# Patient Record
Sex: MALE | Race: WHITE
[De-identification: names, ages, dates, MRNs, and addresses within clinical notes are randomized per-mention and may not be internally consistent; named-entity substitution may affect disease eponyms.]

---

## 2018-02-26 ENCOUNTER — HOSPITAL ENCOUNTER (INPATIENT)
Dept: HOSPITAL 100 - ED | Age: 48
LOS: 2 days | Discharge: HOME | DRG: 690 | End: 2018-02-28
Payer: SELF-PAY

## 2018-02-26 VITALS
OXYGEN SATURATION: 98 % | DIASTOLIC BLOOD PRESSURE: 108 MMHG | TEMPERATURE: 98.2 F | SYSTOLIC BLOOD PRESSURE: 167 MMHG | RESPIRATION RATE: 14 BRPM | HEART RATE: 127 BPM

## 2018-02-26 VITALS
OXYGEN SATURATION: 93 % | RESPIRATION RATE: 18 BRPM | SYSTOLIC BLOOD PRESSURE: 131 MMHG | TEMPERATURE: 97.7 F | DIASTOLIC BLOOD PRESSURE: 73 MMHG | HEART RATE: 109 BPM

## 2018-02-26 VITALS
DIASTOLIC BLOOD PRESSURE: 82 MMHG | OXYGEN SATURATION: 90 % | RESPIRATION RATE: 20 BRPM | SYSTOLIC BLOOD PRESSURE: 124 MMHG | HEART RATE: 123 BPM

## 2018-02-26 VITALS
DIASTOLIC BLOOD PRESSURE: 85 MMHG | RESPIRATION RATE: 20 BRPM | OXYGEN SATURATION: 91 % | HEART RATE: 126 BPM | SYSTOLIC BLOOD PRESSURE: 122 MMHG

## 2018-02-26 VITALS — OXYGEN SATURATION: 95 % | RESPIRATION RATE: 21 BRPM | HEART RATE: 126 BPM

## 2018-02-26 VITALS
SYSTOLIC BLOOD PRESSURE: 142 MMHG | OXYGEN SATURATION: 97 % | RESPIRATION RATE: 16 BRPM | DIASTOLIC BLOOD PRESSURE: 102 MMHG | HEART RATE: 122 BPM

## 2018-02-26 VITALS — RESPIRATION RATE: 16 BRPM | OXYGEN SATURATION: 93 % | HEART RATE: 117 BPM

## 2018-02-26 VITALS
SYSTOLIC BLOOD PRESSURE: 121 MMHG | HEART RATE: 103 BPM | OXYGEN SATURATION: 98 % | DIASTOLIC BLOOD PRESSURE: 84 MMHG | TEMPERATURE: 97.16 F | RESPIRATION RATE: 20 BRPM

## 2018-02-26 VITALS — RESPIRATION RATE: 18 BRPM | OXYGEN SATURATION: 98 %

## 2018-02-26 VITALS — OXYGEN SATURATION: 95 %

## 2018-02-26 VITALS — BODY MASS INDEX: 53.8 KG/M2

## 2018-02-26 DIAGNOSIS — N30.01: Primary | ICD-10-CM

## 2018-02-26 DIAGNOSIS — E66.2: ICD-10-CM

## 2018-02-26 DIAGNOSIS — Z86.718: ICD-10-CM

## 2018-02-26 DIAGNOSIS — F17.201: ICD-10-CM

## 2018-02-26 DIAGNOSIS — I10: ICD-10-CM

## 2018-02-26 DIAGNOSIS — Z79.01: ICD-10-CM

## 2018-02-26 DIAGNOSIS — E11.65: ICD-10-CM

## 2018-02-26 DIAGNOSIS — Z79.4: ICD-10-CM

## 2018-02-26 DIAGNOSIS — I27.20: ICD-10-CM

## 2018-02-26 DIAGNOSIS — R09.02: ICD-10-CM

## 2018-02-26 LAB
ALANINE AMINOTRANSFER ALT/SGPT: 74 U/L (ref 16–61)
ALBUMIN SERPL-MCNC: 3 G/DL (ref 3.2–5)
ALKALINE PHOSPHATASE: 93 U/L (ref 45–117)
ANION GAP: 10 (ref 5–15)
APTT PPP: 34.6 SECONDS (ref 24.1–36.2)
AST(SGOT): 105 U/L (ref 15–37)
BILIRUB DIRECT SERPL-MCNC: 0.15 MG/DL (ref 0–0.3)
BNP,B-TYPE NATRIURETIC PEPTIDE: 8.1 PG/ML (ref 0–100)
BUN SERPL-MCNC: 12 MG/DL (ref 7–18)
BUN/CREAT RATIO: 11.2 RATIO (ref 10–20)
CALCIUM SERPL-MCNC: 8.4 MG/DL (ref 8.5–10.1)
CARBON DIOXIDE: 23 MMOL/L (ref 21–32)
CHLORIDE: 104 MMOL/L (ref 98–107)
DEPRECATED RDW RBC: 45.8 FL (ref 35.1–43.9)
DIFFERENTIAL INDICATED: (no result)
ERYTHROCYTE [DISTWIDTH] IN BLOOD: 13.4 % (ref 11.6–14.6)
EST GLOM FILT RATE - AFR AMER: 95 ML/MIN (ref 60–?)
ESTIMATED CREATININE CLEARANCE: 90.9 ML/MIN
GLOBULIN: 5.1 G/DL (ref 2.2–4.2)
GLUCOSE: 220 MG/DL (ref 74–106)
HCT VFR BLD AUTO: 44.2 % (ref 40–54)
HEMOGLOBIN: 15.1 G/DL (ref 13–16.5)
HGB BLD-MCNC: 15.1 G/DL (ref 13–16.5)
IMMATURE GRANULOCYTES COUNT: 0.03 X10^3/UL (ref 0–0)
LEUKOCYTE ESTERASE UR QL STRIP: 500 /UL
MCV RBC: 93.4 FL (ref 80–94)
MEAN CORP HGB CONC: 34.2 G/GL (ref 32–36)
MEAN PLATELET VOL.: 9.4 FL (ref 6.2–12)
MUCOUS THREADS URNS QL MICRO: (no result) /HPF
PLATELET # BLD: 292 K/MM3 (ref 150–450)
PLATELET COUNT: 292 K/MM3 (ref 150–450)
POSITIVE COUNT: NO
POSITIVE DIFFERENTIAL: NO
POSITIVE MORPHOLOGY: NO
POTASSIUM: 3.5 MMOL/L (ref 3.5–5.1)
PROT UR QL STRIP.AUTO: 100 MG/DL
PROTHROMBIN TIME (PROTIME)PT.: 16.6 SECONDS (ref 11.7–14.9)
RBC # BLD AUTO: 4.73 M/MM3 (ref 4.6–6.2)
RBC DISTRIBUTION WIDTH CV: 13.4 % (ref 11.6–14.6)
RBC DISTRIBUTION WIDTH SD: 45.8 FL (ref 35.1–43.9)
RBC UR QL: (no result) /HPF (ref 0–5)
RBC UR QL: 250 /UL
SP GR UR: 1.01 (ref 1–1.03)
SQUAMOUS URNS QL MICRO: (no result) /HPF (ref 0–5)
URINE PRESERVATIVE: (no result)
WBC # BLD AUTO: 10 K/MM3 (ref 4.4–11)
WHITE BLOOD COUNT: 10 K/MM3 (ref 4.4–11)

## 2018-02-26 PROCEDURE — 87205 SMEAR GRAM STAIN: CPT

## 2018-02-26 PROCEDURE — 80076 HEPATIC FUNCTION PANEL: CPT

## 2018-02-26 PROCEDURE — 82947 ASSAY GLUCOSE BLOOD QUANT: CPT

## 2018-02-26 PROCEDURE — G0463 HOSPITAL OUTPT CLINIC VISIT: HCPCS

## 2018-02-26 PROCEDURE — 83036 HEMOGLOBIN GLYCOSYLATED A1C: CPT

## 2018-02-26 PROCEDURE — 36415 COLL VENOUS BLD VENIPUNCTURE: CPT

## 2018-02-26 PROCEDURE — 82962 GLUCOSE BLOOD TEST: CPT

## 2018-02-26 PROCEDURE — 94668 MNPJ CHEST WALL SBSQ: CPT

## 2018-02-26 PROCEDURE — 71046 X-RAY EXAM CHEST 2 VIEWS: CPT

## 2018-02-26 PROCEDURE — 87070 CULTURE OTHR SPECIMN AEROBIC: CPT

## 2018-02-26 PROCEDURE — 87086 URINE CULTURE/COLONY COUNT: CPT

## 2018-02-26 PROCEDURE — 85610 PROTHROMBIN TIME: CPT

## 2018-02-26 PROCEDURE — 85730 THROMBOPLASTIN TIME PARTIAL: CPT

## 2018-02-26 PROCEDURE — 81001 URINALYSIS AUTO W/SCOPE: CPT

## 2018-02-26 PROCEDURE — 99251: CPT

## 2018-02-26 PROCEDURE — 83880 ASSAY OF NATRIURETIC PEPTIDE: CPT

## 2018-02-26 PROCEDURE — 93005 ELECTROCARDIOGRAM TRACING: CPT

## 2018-02-26 PROCEDURE — 80048 BASIC METABOLIC PNL TOTAL CA: CPT

## 2018-02-26 PROCEDURE — 84484 ASSAY OF TROPONIN QUANT: CPT

## 2018-02-26 PROCEDURE — A4216 STERILE WATER/SALINE, 10 ML: HCPCS

## 2018-02-26 PROCEDURE — 87077 CULTURE AEROBIC IDENTIFY: CPT

## 2018-02-26 PROCEDURE — 94640 AIRWAY INHALATION TREATMENT: CPT

## 2018-02-26 PROCEDURE — 87804 INFLUENZA ASSAY W/OPTIC: CPT

## 2018-02-26 PROCEDURE — 99285 EMERGENCY DEPT VISIT HI MDM: CPT

## 2018-02-26 PROCEDURE — 94667 MNPJ CHEST WALL 1ST: CPT

## 2018-02-26 PROCEDURE — 99406 BEHAV CHNG SMOKING 3-10 MIN: CPT

## 2018-02-26 PROCEDURE — 85025 COMPLETE CBC W/AUTO DIFF WBC: CPT

## 2018-02-26 PROCEDURE — 74176 CT ABD & PELVIS W/O CONTRAST: CPT

## 2018-02-26 RX ADMIN — ALBUTEROL SULFATE 2.5 MG: 2.5 SOLUTION RESPIRATORY (INHALATION) at 14:11

## 2018-02-26 RX ADMIN — SODIUM CHLORIDE 75 ML: 9 INJECTION, SOLUTION INTRAVENOUS at 18:13

## 2018-02-27 VITALS
TEMPERATURE: 97.7 F | RESPIRATION RATE: 18 BRPM | DIASTOLIC BLOOD PRESSURE: 76 MMHG | SYSTOLIC BLOOD PRESSURE: 134 MMHG | OXYGEN SATURATION: 95 % | HEART RATE: 77 BPM

## 2018-02-27 VITALS — HEART RATE: 85 BPM | RESPIRATION RATE: 18 BRPM

## 2018-02-27 VITALS
SYSTOLIC BLOOD PRESSURE: 143 MMHG | RESPIRATION RATE: 18 BRPM | DIASTOLIC BLOOD PRESSURE: 80 MMHG | OXYGEN SATURATION: 96 % | TEMPERATURE: 97.52 F | HEART RATE: 112 BPM

## 2018-02-27 VITALS — OXYGEN SATURATION: 91 % | HEART RATE: 114 BPM | RESPIRATION RATE: 18 BRPM

## 2018-02-27 VITALS
RESPIRATION RATE: 20 BRPM | DIASTOLIC BLOOD PRESSURE: 66 MMHG | OXYGEN SATURATION: 92 % | SYSTOLIC BLOOD PRESSURE: 129 MMHG | HEART RATE: 94 BPM | TEMPERATURE: 97.52 F

## 2018-02-27 VITALS — OXYGEN SATURATION: 95 % | RESPIRATION RATE: 12 BRPM

## 2018-02-27 VITALS
TEMPERATURE: 97.4 F | SYSTOLIC BLOOD PRESSURE: 147 MMHG | RESPIRATION RATE: 18 BRPM | DIASTOLIC BLOOD PRESSURE: 82 MMHG | HEART RATE: 80 BPM | OXYGEN SATURATION: 97 %

## 2018-02-27 VITALS — HEART RATE: 110 BPM | RESPIRATION RATE: 18 BRPM

## 2018-02-27 VITALS — RESPIRATION RATE: 20 BRPM | HEART RATE: 118 BPM

## 2018-02-27 VITALS — RESPIRATION RATE: 12 BRPM

## 2018-02-27 LAB
ANION GAP: 10 (ref 5–15)
BUN SERPL-MCNC: 14 MG/DL (ref 7–18)
BUN/CREAT RATIO: 12.7 RATIO (ref 10–20)
CALCIUM SERPL-MCNC: 8.6 MG/DL (ref 8.5–10.1)
CARBON DIOXIDE: 21 MMOL/L (ref 21–32)
CHLORIDE: 101 MMOL/L (ref 98–107)
DEPRECATED RDW RBC: 46.6 FL (ref 35.1–43.9)
DIFFERENTIAL INDICATED: (no result)
ERYTHROCYTE [DISTWIDTH] IN BLOOD: 13.5 % (ref 11.6–14.6)
EST GLOM FILT RATE - AFR AMER: 92 ML/MIN (ref 60–?)
ESTIMATED CREATININE CLEARANCE: 88.42 ML/MIN
GLUCOSE: 381 MG/DL (ref 74–106)
GLUCOSE: 494 MG/DL (ref 74–106)
HCT VFR BLD AUTO: 44.3 % (ref 40–54)
HEMOGLOBIN: 14.8 G/DL (ref 13–16.5)
HGB BLD-MCNC: 14.8 G/DL (ref 13–16.5)
IMMATURE GRANULOCYTES COUNT: 0.03 X10^3/UL (ref 0–0)
MCV RBC: 95.1 FL (ref 80–94)
MEAN CORP HGB CONC: 33.4 G/GL (ref 32–36)
MEAN PLATELET VOL.: 9.9 FL (ref 6.2–12)
PLATELET # BLD: 253 K/MM3 (ref 150–450)
PLATELET COUNT: 253 K/MM3 (ref 150–450)
POSITIVE COUNT: NO
POSITIVE DIFFERENTIAL: NO
POSITIVE MORPHOLOGY: NO
POTASSIUM: 4.1 MMOL/L (ref 3.5–5.1)
RBC # BLD AUTO: 4.66 M/MM3 (ref 4.6–6.2)
RBC DISTRIBUTION WIDTH CV: 13.5 % (ref 11.6–14.6)
RBC DISTRIBUTION WIDTH SD: 46.6 FL (ref 35.1–43.9)
WBC # BLD AUTO: 11.8 K/MM3 (ref 4.4–11)
WHITE BLOOD COUNT: 11.8 K/MM3 (ref 4.4–11)

## 2018-02-27 RX ADMIN — SODIUM CHLORIDE 75 ML: 9 INJECTION, SOLUTION INTRAVENOUS at 07:59

## 2018-02-27 RX ADMIN — HYDROCHLOROTHIAZIDE 12.5 MG: 12.5 CAPSULE ORAL at 08:00

## 2018-02-28 VITALS
RESPIRATION RATE: 18 BRPM | HEART RATE: 76 BPM | TEMPERATURE: 97.5 F | DIASTOLIC BLOOD PRESSURE: 81 MMHG | OXYGEN SATURATION: 95 % | SYSTOLIC BLOOD PRESSURE: 123 MMHG

## 2018-02-28 VITALS — RESPIRATION RATE: 20 BRPM | HEART RATE: 96 BPM

## 2018-02-28 VITALS — OXYGEN SATURATION: 94 % | HEART RATE: 64 BPM | RESPIRATION RATE: 18 BRPM

## 2018-02-28 VITALS
DIASTOLIC BLOOD PRESSURE: 82 MMHG | OXYGEN SATURATION: 93 % | SYSTOLIC BLOOD PRESSURE: 138 MMHG | HEART RATE: 94 BPM | RESPIRATION RATE: 16 BRPM | TEMPERATURE: 98.06 F

## 2018-02-28 VITALS
TEMPERATURE: 98.3 F | SYSTOLIC BLOOD PRESSURE: 133 MMHG | HEART RATE: 82 BPM | RESPIRATION RATE: 16 BRPM | OXYGEN SATURATION: 98 % | DIASTOLIC BLOOD PRESSURE: 85 MMHG

## 2018-02-28 VITALS — RESPIRATION RATE: 18 BRPM | HEART RATE: 90 BPM

## 2018-02-28 VITALS — RESPIRATION RATE: 16 BRPM

## 2018-02-28 LAB
ANION GAP: 9 (ref 5–15)
BUN SERPL-MCNC: 16 MG/DL (ref 7–18)
BUN/CREAT RATIO: 12.8 RATIO (ref 10–20)
CALCIUM SERPL-MCNC: 8.6 MG/DL (ref 8.5–10.1)
CARBON DIOXIDE: 25 MMOL/L (ref 21–32)
CHLORIDE: 102 MMOL/L (ref 98–107)
EST GLOM FILT RATE - AFR AMER: 79 ML/MIN (ref 60–?)
ESTIMATED CREATININE CLEARANCE: 77.81 ML/MIN
GLUCOSE: 239 MG/DL (ref 74–106)
POTASSIUM: 3.8 MMOL/L (ref 3.5–5.1)

## 2018-02-28 RX ADMIN — SODIUM CHLORIDE, PRESERVATIVE FREE 0 ML: 5 INJECTION INTRAVENOUS at 09:14

## 2018-02-28 RX ADMIN — HYDROCHLOROTHIAZIDE 12.5 MG: 12.5 CAPSULE ORAL at 11:31

## 2018-03-14 ENCOUNTER — HOSPITAL ENCOUNTER (INPATIENT)
Dept: HOSPITAL 100 - ED | Age: 48
LOS: 6 days | Discharge: NURSING, REHAB OR LONG-TERM CARE | DRG: 235 | End: 2018-03-20
Payer: MEDICAID

## 2018-03-14 VITALS — OXYGEN SATURATION: 97 % | HEART RATE: 71 BPM | RESPIRATION RATE: 22 BRPM

## 2018-03-14 VITALS
DIASTOLIC BLOOD PRESSURE: 63 MMHG | RESPIRATION RATE: 20 BRPM | TEMPERATURE: 98.7 F | OXYGEN SATURATION: 96 % | SYSTOLIC BLOOD PRESSURE: 123 MMHG | HEART RATE: 88 BPM

## 2018-03-14 VITALS
SYSTOLIC BLOOD PRESSURE: 138 MMHG | TEMPERATURE: 97.88 F | DIASTOLIC BLOOD PRESSURE: 67 MMHG | OXYGEN SATURATION: 96 % | RESPIRATION RATE: 18 BRPM | HEART RATE: 85 BPM

## 2018-03-14 VITALS
SYSTOLIC BLOOD PRESSURE: 145 MMHG | DIASTOLIC BLOOD PRESSURE: 71 MMHG | RESPIRATION RATE: 18 BRPM | OXYGEN SATURATION: 100 %

## 2018-03-14 VITALS
HEART RATE: 82 BPM | RESPIRATION RATE: 17 BRPM | SYSTOLIC BLOOD PRESSURE: 145 MMHG | DIASTOLIC BLOOD PRESSURE: 71 MMHG | OXYGEN SATURATION: 94 %

## 2018-03-14 VITALS — SYSTOLIC BLOOD PRESSURE: 136 MMHG | OXYGEN SATURATION: 94 % | DIASTOLIC BLOOD PRESSURE: 76 MMHG

## 2018-03-14 VITALS — SYSTOLIC BLOOD PRESSURE: 132 MMHG | DIASTOLIC BLOOD PRESSURE: 82 MMHG

## 2018-03-14 VITALS — BODY MASS INDEX: 56.1 KG/M2 | BODY MASS INDEX: 58.3 KG/M2

## 2018-03-14 VITALS — OXYGEN SATURATION: 97 %

## 2018-03-14 DIAGNOSIS — R31.9: ICD-10-CM

## 2018-03-14 DIAGNOSIS — Z79.899: ICD-10-CM

## 2018-03-14 DIAGNOSIS — S72.434A: Primary | ICD-10-CM

## 2018-03-14 DIAGNOSIS — G47.33: ICD-10-CM
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
## 2018-03-14 DIAGNOSIS — F17.201: ICD-10-CM

## 2018-03-14 DIAGNOSIS — Z79.4: ICD-10-CM

## 2018-03-14 DIAGNOSIS — I27.20: ICD-10-CM

## 2018-03-14 DIAGNOSIS — E66.01: ICD-10-CM

## 2018-03-14 DIAGNOSIS — K21.9: ICD-10-CM

## 2018-03-14 DIAGNOSIS — I10: ICD-10-CM
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
## 2018-03-14 DIAGNOSIS — E11.9: ICD-10-CM

## 2018-03-14 DIAGNOSIS — Z79.02: ICD-10-CM

## 2018-03-14 DIAGNOSIS — I07.1: ICD-10-CM

## 2018-03-14 DIAGNOSIS — Z86.711: ICD-10-CM

## 2018-03-14 DIAGNOSIS — J98.4: ICD-10-CM

## 2018-03-14 LAB
ANION GAP: 10 (ref 5–15)
APTT PPP: 35.8 SECONDS (ref 24.1–36.2)
BUN SERPL-MCNC: 14 MG/DL (ref 7–18)
BUN/CREAT RATIO: 13.3 RATIO (ref 10–20)
CALCIUM SERPL-MCNC: 8.6 MG/DL (ref 8.5–10.1)
CARBON DIOXIDE: 22 MMOL/L (ref 21–32)
CHLORIDE: 107 MMOL/L (ref 98–107)
DEPRECATED RDW RBC: 47.4 FL (ref 35.1–43.9)
DIFFERENTIAL INDICATED: (no result)
ERYTHROCYTE [DISTWIDTH] IN BLOOD: 13.4 % (ref 11.6–14.6)
EST GLOM FILT RATE - AFR AMER: 97 ML/MIN (ref 60–?)
ESTIMATED CREATININE CLEARANCE: 92.63 ML/MIN
GLUCOSE: 93 MG/DL (ref 74–106)
HCT VFR BLD AUTO: 42.9 % (ref 40–54)
HEMOGLOBIN: 14.2 G/DL (ref 13–16.5)
HGB BLD-MCNC: 14.2 G/DL (ref 13–16.5)
IMMATURE GRANULOCYTES COUNT: 0.03 X10^3/UL (ref 0–0)
MCV RBC: 96.6 FL (ref 80–94)
MEAN CORP HGB CONC: 33.1 G/GL (ref 32–36)
MEAN PLATELET VOL.: 9.6 FL (ref 6.2–12)
PLATELET # BLD: 248 K/MM3 (ref 150–450)
PLATELET COUNT: 248 K/MM3 (ref 150–450)
POSITIVE COUNT: NO
POSITIVE DIFFERENTIAL: NO
POSITIVE MORPHOLOGY: NO
POTASSIUM: 4.3 MMOL/L (ref 3.5–5.1)
PROTHROMBIN TIME (PROTIME)PT.: 19.2 SECONDS (ref 11.7–14.9)
RBC # BLD AUTO: 4.44 M/MM3 (ref 4.6–6.2)
RBC DISTRIBUTION WIDTH CV: 13.4 % (ref 11.6–14.6)
RBC DISTRIBUTION WIDTH SD: 47.4 FL (ref 35.1–43.9)
WBC # BLD AUTO: 8.9 K/MM3 (ref 4.4–11)
WHITE BLOOD COUNT: 8.9 K/MM3 (ref 4.4–11)

## 2018-03-14 PROCEDURE — 97530 THERAPEUTIC ACTIVITIES: CPT

## 2018-03-14 PROCEDURE — 80048 BASIC METABOLIC PNL TOTAL CA: CPT

## 2018-03-14 PROCEDURE — 72170 X-RAY EXAM OF PELVIS: CPT

## 2018-03-14 PROCEDURE — 85730 THROMBOPLASTIN TIME PARTIAL: CPT

## 2018-03-14 PROCEDURE — 93005 ELECTROCARDIOGRAM TRACING: CPT

## 2018-03-14 PROCEDURE — 82962 GLUCOSE BLOOD TEST: CPT

## 2018-03-14 PROCEDURE — 81001 URINALYSIS AUTO W/SCOPE: CPT

## 2018-03-14 PROCEDURE — 36415 COLL VENOUS BLD VENIPUNCTURE: CPT

## 2018-03-14 PROCEDURE — 73552 X-RAY EXAM OF FEMUR 2/>: CPT

## 2018-03-14 PROCEDURE — 71045 X-RAY EXAM CHEST 1 VIEW: CPT

## 2018-03-14 PROCEDURE — 73700 CT LOWER EXTREMITY W/O DYE: CPT

## 2018-03-14 PROCEDURE — 73560 X-RAY EXAM OF KNEE 1 OR 2: CPT

## 2018-03-14 PROCEDURE — 85610 PROTHROMBIN TIME: CPT

## 2018-03-14 PROCEDURE — A4216 STERILE WATER/SALINE, 10 ML: HCPCS

## 2018-03-14 PROCEDURE — 94002 VENT MGMT INPAT INIT DAY: CPT

## 2018-03-14 PROCEDURE — 99284 EMERGENCY DEPT VISIT MOD MDM: CPT

## 2018-03-14 PROCEDURE — 97162 PT EVAL MOD COMPLEX 30 MIN: CPT

## 2018-03-14 PROCEDURE — 80076 HEPATIC FUNCTION PANEL: CPT

## 2018-03-14 PROCEDURE — 97165 OT EVAL LOW COMPLEX 30 MIN: CPT

## 2018-03-14 PROCEDURE — 93971 EXTREMITY STUDY: CPT

## 2018-03-14 PROCEDURE — 73590 X-RAY EXAM OF LOWER LEG: CPT

## 2018-03-14 PROCEDURE — 83036 HEMOGLOBIN GLYCOSYLATED A1C: CPT

## 2018-03-14 PROCEDURE — 97110 THERAPEUTIC EXERCISES: CPT

## 2018-03-14 PROCEDURE — 85025 COMPLETE CBC W/AUTO DIFF WBC: CPT

## 2018-03-14 RX ADMIN — SODIUM CHLORIDE, PRESERVATIVE FREE 0 ML: 5 INJECTION INTRAVENOUS at 23:01

## 2018-03-15 VITALS
RESPIRATION RATE: 18 BRPM | SYSTOLIC BLOOD PRESSURE: 104 MMHG | TEMPERATURE: 98.24 F | DIASTOLIC BLOOD PRESSURE: 58 MMHG | HEART RATE: 66 BPM | OXYGEN SATURATION: 95 %

## 2018-03-15 VITALS
SYSTOLIC BLOOD PRESSURE: 91 MMHG | RESPIRATION RATE: 20 BRPM | OXYGEN SATURATION: 92 % | HEART RATE: 74 BPM | DIASTOLIC BLOOD PRESSURE: 56 MMHG | TEMPERATURE: 98 F

## 2018-03-15 VITALS
SYSTOLIC BLOOD PRESSURE: 106 MMHG | TEMPERATURE: 98.7 F | HEART RATE: 73 BPM | DIASTOLIC BLOOD PRESSURE: 58 MMHG | RESPIRATION RATE: 20 BRPM | OXYGEN SATURATION: 94 %

## 2018-03-15 VITALS
HEART RATE: 87 BPM | DIASTOLIC BLOOD PRESSURE: 69 MMHG | OXYGEN SATURATION: 96 % | SYSTOLIC BLOOD PRESSURE: 115 MMHG | TEMPERATURE: 99.32 F | RESPIRATION RATE: 18 BRPM

## 2018-03-15 VITALS — OXYGEN SATURATION: 95 %

## 2018-03-15 VITALS — HEART RATE: 73 BPM

## 2018-03-15 LAB
ALANINE AMINOTRANSFER ALT/SGPT: 72 U/L (ref 16–61)
ALBUMIN SERPL-MCNC: 3.2 G/DL (ref 3.2–5)
ALKALINE PHOSPHATASE: 68 U/L (ref 45–117)
APTT PPP: 35.2 SECONDS (ref 24.1–36.2)
APTT PPP: 36.5 SECONDS (ref 24.1–36.2)
AST(SGOT): 83 U/L (ref 15–37)
BILIRUB DIRECT SERPL-MCNC: 0.14 MG/DL (ref 0–0.3)
DEPRECATED RDW RBC: 46.6 FL (ref 35.1–43.9)
DIFFERENTIAL INDICATED: (no result)
ERYTHROCYTE [DISTWIDTH] IN BLOOD: 13.6 % (ref 11.6–14.6)
GLOBULIN: 4.2 G/DL (ref 2.2–4.2)
HCT VFR BLD AUTO: 43.6 % (ref 40–54)
HEMOGLOBIN: 14.4 G/DL (ref 13–16.5)
HGB BLD-MCNC: 14.4 G/DL (ref 13–16.5)
IMMATURE GRANULOCYTES COUNT: 0.03 X10^3/UL (ref 0–0)
MCV RBC: 96 FL (ref 80–94)
MEAN CORP HGB CONC: 33 G/GL (ref 32–36)
MEAN PLATELET VOL.: 10.4 FL (ref 6.2–12)
PLATELET # BLD: 304 K/MM3 (ref 150–450)
PLATELET COUNT: 304 K/MM3 (ref 150–450)
POSITIVE COUNT: NO
POSITIVE DIFFERENTIAL: NO
POSITIVE MORPHOLOGY: NO
PROTHROMBIN TIME (PROTIME)PT.: 15.6 SECONDS (ref 11.7–14.9)
RBC # BLD AUTO: 4.54 M/MM3 (ref 4.6–6.2)
RBC DISTRIBUTION WIDTH CV: 13.6 % (ref 11.6–14.6)
RBC DISTRIBUTION WIDTH SD: 46.6 FL (ref 35.1–43.9)
WBC # BLD AUTO: 9.9 K/MM3 (ref 4.4–11)
WHITE BLOOD COUNT: 9.9 K/MM3 (ref 4.4–11)

## 2018-03-15 RX ADMIN — HEPARIN SODIUM 22 UNITS: 10000 INJECTION, SOLUTION INTRAVENOUS at 08:41

## 2018-03-15 RX ADMIN — SODIUM CHLORIDE, PRESERVATIVE FREE 0 ML: 5 INJECTION INTRAVENOUS at 10:52

## 2018-03-15 RX ADMIN — SODIUM CHLORIDE, PRESERVATIVE FREE 0 ML: 5 INJECTION INTRAVENOUS at 19:17

## 2018-03-15 RX ADMIN — SODIUM CHLORIDE, PRESERVATIVE FREE 0 ML: 5 INJECTION INTRAVENOUS at 23:46

## 2018-03-15 RX ADMIN — SODIUM CHLORIDE, PRESERVATIVE FREE 0 ML: 5 INJECTION INTRAVENOUS at 12:04

## 2018-03-15 RX ADMIN — SODIUM CHLORIDE, PRESERVATIVE FREE 0 ML: 5 INJECTION INTRAVENOUS at 03:40

## 2018-03-15 RX ADMIN — SODIUM CHLORIDE, PRESERVATIVE FREE 0 ML: 5 INJECTION INTRAVENOUS at 15:31

## 2018-03-15 RX ADMIN — SODIUM CHLORIDE, PRESERVATIVE FREE 0 ML: 5 INJECTION INTRAVENOUS at 07:16

## 2018-03-16 VITALS
RESPIRATION RATE: 18 BRPM | HEART RATE: 97 BPM | SYSTOLIC BLOOD PRESSURE: 107 MMHG | TEMPERATURE: 97.34 F | DIASTOLIC BLOOD PRESSURE: 66 MMHG | OXYGEN SATURATION: 96 %

## 2018-03-16 VITALS
SYSTOLIC BLOOD PRESSURE: 119 MMHG | RESPIRATION RATE: 18 BRPM | OXYGEN SATURATION: 98 % | HEART RATE: 89 BPM | DIASTOLIC BLOOD PRESSURE: 69 MMHG | TEMPERATURE: 99.3 F

## 2018-03-16 VITALS — SYSTOLIC BLOOD PRESSURE: 116 MMHG | DIASTOLIC BLOOD PRESSURE: 70 MMHG | HEART RATE: 85 BPM

## 2018-03-16 VITALS
RESPIRATION RATE: 20 BRPM | DIASTOLIC BLOOD PRESSURE: 60 MMHG | OXYGEN SATURATION: 93 % | TEMPERATURE: 98.78 F | SYSTOLIC BLOOD PRESSURE: 111 MMHG | HEART RATE: 90 BPM

## 2018-03-16 VITALS
OXYGEN SATURATION: 94 % | SYSTOLIC BLOOD PRESSURE: 113 MMHG | RESPIRATION RATE: 20 BRPM | TEMPERATURE: 98.24 F | HEART RATE: 84 BPM | DIASTOLIC BLOOD PRESSURE: 72 MMHG

## 2018-03-16 VITALS — HEART RATE: 90 BPM

## 2018-03-16 LAB
ANION GAP: 7 (ref 5–15)
BUN SERPL-MCNC: 14 MG/DL (ref 7–18)
BUN/CREAT RATIO: 13 RATIO (ref 10–20)
CALCIUM SERPL-MCNC: 8.3 MG/DL (ref 8.5–10.1)
CARBON DIOXIDE: 27 MMOL/L (ref 21–32)
CHLORIDE: 102 MMOL/L (ref 98–107)
DEPRECATED RDW RBC: 46.2 FL (ref 35.1–43.9)
DIFFERENTIAL INDICATED: (no result)
ERYTHROCYTE [DISTWIDTH] IN BLOOD: 13.4 % (ref 11.6–14.6)
EST GLOM FILT RATE - AFR AMER: 94 ML/MIN (ref 60–?)
ESTIMATED CREATININE CLEARANCE: 90.06 ML/MIN
GLUCOSE: 95 MG/DL (ref 74–106)
HCT VFR BLD AUTO: 41.2 % (ref 40–54)
HEMOGLOBIN: 13.8 G/DL (ref 13–16.5)
HGB BLD-MCNC: 13.8 G/DL (ref 13–16.5)
IMMATURE GRANULOCYTES COUNT: 0.02 X10^3/UL (ref 0–0)
LEUKOCYTE ESTERASE UR QL STRIP: 25 /UL
MCV RBC: 96.3 FL (ref 80–94)
MEAN CORP HGB CONC: 33.5 G/GL (ref 32–36)
MEAN PLATELET VOL.: 10.2 FL (ref 6.2–12)
MUCOUS THREADS URNS QL MICRO: (no result) /HPF
PLATELET # BLD: 246 K/MM3 (ref 150–450)
PLATELET COUNT: 246 K/MM3 (ref 150–450)
POSITIVE COUNT: NO
POSITIVE DIFFERENTIAL: NO
POSITIVE MORPHOLOGY: NO
POTASSIUM: 3.7 MMOL/L (ref 3.5–5.1)
RBC # BLD AUTO: 4.28 M/MM3 (ref 4.6–6.2)
RBC DISTRIBUTION WIDTH CV: 13.4 % (ref 11.6–14.6)
RBC DISTRIBUTION WIDTH SD: 46.2 FL (ref 35.1–43.9)
RBC UR QL: (no result) /HPF (ref 0–5)
RBC UR QL: 250 /UL
SP GR UR: 1.01 (ref 1–1.03)
SQUAMOUS URNS QL MICRO: (no result) /HPF (ref 0–5)
URINE PRESERVATIVE: (no result)
WBC # BLD AUTO: 8.4 K/MM3 (ref 4.4–11)
WHITE BLOOD COUNT: 8.4 K/MM3 (ref 4.4–11)

## 2018-03-16 RX ADMIN — SODIUM CHLORIDE, PRESERVATIVE FREE 0 ML: 5 INJECTION INTRAVENOUS at 13:07

## 2018-03-16 RX ADMIN — SODIUM CHLORIDE, PRESERVATIVE FREE 0 ML: 5 INJECTION INTRAVENOUS at 08:19

## 2018-03-16 RX ADMIN — SODIUM CHLORIDE, PRESERVATIVE FREE 0 ML: 5 INJECTION INTRAVENOUS at 08:06

## 2018-03-17 VITALS
TEMPERATURE: 96.5 F | RESPIRATION RATE: 18 BRPM | DIASTOLIC BLOOD PRESSURE: 65 MMHG | SYSTOLIC BLOOD PRESSURE: 112 MMHG | OXYGEN SATURATION: 94 % | HEART RATE: 75 BPM

## 2018-03-17 VITALS
TEMPERATURE: 98.5 F | HEART RATE: 91 BPM | SYSTOLIC BLOOD PRESSURE: 146 MMHG | RESPIRATION RATE: 20 BRPM | OXYGEN SATURATION: 97 % | DIASTOLIC BLOOD PRESSURE: 70 MMHG

## 2018-03-17 VITALS
DIASTOLIC BLOOD PRESSURE: 59 MMHG | RESPIRATION RATE: 16 BRPM | OXYGEN SATURATION: 95 % | TEMPERATURE: 98.42 F | HEART RATE: 81 BPM | SYSTOLIC BLOOD PRESSURE: 111 MMHG

## 2018-03-17 VITALS
DIASTOLIC BLOOD PRESSURE: 64 MMHG | HEART RATE: 99 BPM | TEMPERATURE: 98 F | SYSTOLIC BLOOD PRESSURE: 128 MMHG | OXYGEN SATURATION: 97 % | RESPIRATION RATE: 16 BRPM

## 2018-03-17 VITALS — OXYGEN SATURATION: 92 %

## 2018-03-17 VITALS — HEART RATE: 81 BPM

## 2018-03-17 VITALS — TEMPERATURE: 101.9 F

## 2018-03-17 RX ADMIN — SODIUM CHLORIDE, PRESERVATIVE FREE 0 ML: 5 INJECTION INTRAVENOUS at 13:30

## 2018-03-17 RX ADMIN — SODIUM CHLORIDE, PRESERVATIVE FREE 0 ML: 5 INJECTION INTRAVENOUS at 09:58

## 2018-03-17 RX ADMIN — SODIUM CHLORIDE, PRESERVATIVE FREE 0 ML: 5 INJECTION INTRAVENOUS at 13:35

## 2018-03-17 RX ADMIN — SODIUM CHLORIDE, PRESERVATIVE FREE 0 ML: 5 INJECTION INTRAVENOUS at 21:27

## 2018-03-17 RX ADMIN — SODIUM CHLORIDE, PRESERVATIVE FREE 0 ML: 5 INJECTION INTRAVENOUS at 17:20

## 2018-03-18 VITALS
HEART RATE: 89 BPM | TEMPERATURE: 98.42 F | SYSTOLIC BLOOD PRESSURE: 133 MMHG | DIASTOLIC BLOOD PRESSURE: 69 MMHG | OXYGEN SATURATION: 96 % | RESPIRATION RATE: 18 BRPM

## 2018-03-18 VITALS
SYSTOLIC BLOOD PRESSURE: 129 MMHG | DIASTOLIC BLOOD PRESSURE: 78 MMHG | TEMPERATURE: 98.1 F | HEART RATE: 84 BPM | OXYGEN SATURATION: 96 % | RESPIRATION RATE: 20 BRPM

## 2018-03-18 VITALS
HEART RATE: 106 BPM | TEMPERATURE: 97.52 F | RESPIRATION RATE: 18 BRPM | DIASTOLIC BLOOD PRESSURE: 59 MMHG | SYSTOLIC BLOOD PRESSURE: 117 MMHG | OXYGEN SATURATION: 96 %

## 2018-03-18 VITALS
TEMPERATURE: 97.8 F | DIASTOLIC BLOOD PRESSURE: 78 MMHG | HEART RATE: 98 BPM | RESPIRATION RATE: 20 BRPM | SYSTOLIC BLOOD PRESSURE: 130 MMHG | OXYGEN SATURATION: 96 %

## 2018-03-18 VITALS — OXYGEN SATURATION: 96 %

## 2018-03-18 RX ADMIN — SODIUM CHLORIDE, PRESERVATIVE FREE 0 ML: 5 INJECTION INTRAVENOUS at 08:11

## 2018-03-18 RX ADMIN — SODIUM CHLORIDE, PRESERVATIVE FREE 0 ML: 5 INJECTION INTRAVENOUS at 00:32

## 2018-03-19 VITALS
TEMPERATURE: 96.98 F | RESPIRATION RATE: 18 BRPM | OXYGEN SATURATION: 96 % | HEART RATE: 77 BPM | SYSTOLIC BLOOD PRESSURE: 118 MMHG | DIASTOLIC BLOOD PRESSURE: 60 MMHG

## 2018-03-19 VITALS
DIASTOLIC BLOOD PRESSURE: 68 MMHG | OXYGEN SATURATION: 99 % | RESPIRATION RATE: 20 BRPM | TEMPERATURE: 97.7 F | HEART RATE: 80 BPM | SYSTOLIC BLOOD PRESSURE: 127 MMHG

## 2018-03-19 VITALS
TEMPERATURE: 97.34 F | DIASTOLIC BLOOD PRESSURE: 79 MMHG | SYSTOLIC BLOOD PRESSURE: 129 MMHG | RESPIRATION RATE: 18 BRPM | OXYGEN SATURATION: 96 % | HEART RATE: 80 BPM

## 2018-03-19 VITALS
SYSTOLIC BLOOD PRESSURE: 140 MMHG | TEMPERATURE: 97.88 F | OXYGEN SATURATION: 98 % | DIASTOLIC BLOOD PRESSURE: 65 MMHG | HEART RATE: 80 BPM | RESPIRATION RATE: 20 BRPM

## 2018-03-20 VITALS
HEART RATE: 63 BPM | DIASTOLIC BLOOD PRESSURE: 57 MMHG | RESPIRATION RATE: 16 BRPM | SYSTOLIC BLOOD PRESSURE: 114 MMHG | TEMPERATURE: 96.98 F | OXYGEN SATURATION: 93 %

## 2018-03-20 VITALS
DIASTOLIC BLOOD PRESSURE: 53 MMHG | HEART RATE: 63 BPM | SYSTOLIC BLOOD PRESSURE: 106 MMHG | OXYGEN SATURATION: 95 % | RESPIRATION RATE: 18 BRPM | TEMPERATURE: 98 F

## 2018-03-20 VITALS — HEART RATE: 78 BPM | DIASTOLIC BLOOD PRESSURE: 65 MMHG | SYSTOLIC BLOOD PRESSURE: 114 MMHG

## 2018-03-20 VITALS
HEART RATE: 83 BPM | TEMPERATURE: 97.7 F | SYSTOLIC BLOOD PRESSURE: 112 MMHG | DIASTOLIC BLOOD PRESSURE: 50 MMHG | OXYGEN SATURATION: 100 % | RESPIRATION RATE: 18 BRPM

## 2018-03-20 VITALS — HEART RATE: 68 BPM | DIASTOLIC BLOOD PRESSURE: 53 MMHG | SYSTOLIC BLOOD PRESSURE: 108 MMHG

## 2018-03-29 ENCOUNTER — HOSPITAL ENCOUNTER (INPATIENT)
Age: 48
LOS: 5 days | Discharge: NURSING, REHAB OR LONG-TERM CARE | DRG: 211 | End: 2018-04-03
Payer: MEDICAID

## 2018-03-29 VITALS
OXYGEN SATURATION: 94 % | TEMPERATURE: 98 F | SYSTOLIC BLOOD PRESSURE: 124 MMHG | HEART RATE: 76 BPM | RESPIRATION RATE: 18 BRPM | DIASTOLIC BLOOD PRESSURE: 56 MMHG

## 2018-03-29 VITALS
SYSTOLIC BLOOD PRESSURE: 118 MMHG | RESPIRATION RATE: 18 BRPM | OXYGEN SATURATION: 99 % | TEMPERATURE: 97.7 F | DIASTOLIC BLOOD PRESSURE: 69 MMHG | HEART RATE: 87 BPM

## 2018-03-29 VITALS
DIASTOLIC BLOOD PRESSURE: 56 MMHG | OXYGEN SATURATION: 93 % | SYSTOLIC BLOOD PRESSURE: 108 MMHG | TEMPERATURE: 98.1 F | RESPIRATION RATE: 18 BRPM | HEART RATE: 73 BPM

## 2018-03-29 VITALS — BODY MASS INDEX: 54.6 KG/M2 | BODY MASS INDEX: 54.4 KG/M2 | BODY MASS INDEX: 54.3 KG/M2

## 2018-03-29 DIAGNOSIS — E66.01: ICD-10-CM

## 2018-03-29 DIAGNOSIS — G89.29: ICD-10-CM
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
## 2018-03-29 DIAGNOSIS — Z86.711: ICD-10-CM
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
## 2018-03-29 DIAGNOSIS — G47.33: ICD-10-CM

## 2018-03-29 DIAGNOSIS — E11.9: ICD-10-CM

## 2018-03-29 DIAGNOSIS — K21.9: ICD-10-CM

## 2018-03-29 DIAGNOSIS — M19.90: ICD-10-CM

## 2018-03-29 DIAGNOSIS — F41.9: ICD-10-CM

## 2018-03-29 DIAGNOSIS — S72.431A: Primary | ICD-10-CM

## 2018-03-29 DIAGNOSIS — Z86.74: ICD-10-CM

## 2018-03-29 DIAGNOSIS — F32.9: ICD-10-CM
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
## 2018-03-29 DIAGNOSIS — M54.9: ICD-10-CM
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
## 2018-03-29 DIAGNOSIS — Z87.891: ICD-10-CM

## 2018-03-29 DIAGNOSIS — I10: ICD-10-CM

## 2018-03-29 LAB
ANION GAP: 8 (ref 5–15)
BUN SERPL-MCNC: 13 MG/DL (ref 7–18)
BUN/CREAT RATIO: 13.7 RATIO (ref 10–20)
CALCIUM SERPL-MCNC: 8.6 MG/DL (ref 8.5–10.1)
CARBON DIOXIDE: 26 MMOL/L (ref 21–32)
CHLORIDE: 102 MMOL/L (ref 98–107)
DEPRECATED RDW RBC: 44.5 FL (ref 35.1–43.9)
DIFFERENTIAL INDICATED: (no result)
ERYTHROCYTE [DISTWIDTH] IN BLOOD: 13.2 % (ref 11.6–14.6)
EST GLOM FILT RATE - AFR AMER: 109 ML/MIN (ref 60–?)
ESTIMATED CREATININE CLEARANCE: 102.38 ML/MIN
GLUCOSE: 91 MG/DL (ref 74–106)
HCT VFR BLD AUTO: 41.3 % (ref 40–54)
HEMOGLOBIN: 13.7 G/DL (ref 13–16.5)
HGB BLD-MCNC: 13.7 G/DL (ref 13–16.5)
IMMATURE GRANULOCYTES COUNT: 0.01 X10^3/UL (ref 0–0)
MCV RBC: 95.2 FL (ref 80–94)
MEAN CORP HGB CONC: 33.2 G/GL (ref 32–36)
MEAN PLATELET VOL.: 9.7 FL (ref 6.2–12)
PLATELET # BLD: 344 K/MM3 (ref 150–450)
PLATELET COUNT: 344 K/MM3 (ref 150–450)
POSITIVE COUNT: NO
POSITIVE DIFFERENTIAL: NO
POSITIVE MORPHOLOGY: NO
POTASSIUM: 3.6 MMOL/L (ref 3.5–5.1)
RBC # BLD AUTO: 4.34 M/MM3 (ref 4.6–6.2)
RBC DISTRIBUTION WIDTH CV: 13.2 % (ref 11.6–14.6)
RBC DISTRIBUTION WIDTH SD: 44.5 FL (ref 35.1–43.9)
WBC # BLD AUTO: 9.1 K/MM3 (ref 4.4–11)
WHITE BLOOD COUNT: 9.1 K/MM3 (ref 4.4–11)

## 2018-03-29 PROCEDURE — 97530 THERAPEUTIC ACTIVITIES: CPT

## 2018-03-29 PROCEDURE — 73560 X-RAY EXAM OF KNEE 1 OR 2: CPT

## 2018-03-29 PROCEDURE — G0463 HOSPITAL OUTPT CLINIC VISIT: HCPCS

## 2018-03-29 PROCEDURE — 97166 OT EVAL MOD COMPLEX 45 MIN: CPT

## 2018-03-29 PROCEDURE — 97162 PT EVAL MOD COMPLEX 30 MIN: CPT

## 2018-03-29 PROCEDURE — 83036 HEMOGLOBIN GLYCOSYLATED A1C: CPT

## 2018-03-29 PROCEDURE — 99251: CPT

## 2018-03-29 PROCEDURE — 97535 SELF CARE MNGMENT TRAINING: CPT

## 2018-03-29 PROCEDURE — 71045 X-RAY EXAM CHEST 1 VIEW: CPT

## 2018-03-29 PROCEDURE — 86900 BLOOD TYPING SEROLOGIC ABO: CPT

## 2018-03-29 PROCEDURE — A4216 STERILE WATER/SALINE, 10 ML: HCPCS

## 2018-03-29 PROCEDURE — 86850 RBC ANTIBODY SCREEN: CPT

## 2018-03-29 PROCEDURE — 76000 FLUOROSCOPY <1 HR PHYS/QHP: CPT

## 2018-03-29 PROCEDURE — 36415 COLL VENOUS BLD VENIPUNCTURE: CPT

## 2018-03-29 PROCEDURE — 80048 BASIC METABOLIC PNL TOTAL CA: CPT

## 2018-03-29 PROCEDURE — 85027 COMPLETE CBC AUTOMATED: CPT

## 2018-03-29 PROCEDURE — 82962 GLUCOSE BLOOD TEST: CPT

## 2018-03-29 PROCEDURE — 85025 COMPLETE CBC W/AUTO DIFF WBC: CPT

## 2018-03-29 PROCEDURE — 93005 ELECTROCARDIOGRAM TRACING: CPT

## 2018-03-29 PROCEDURE — 99406 BEHAV CHNG SMOKING 3-10 MIN: CPT

## 2018-03-29 PROCEDURE — 97116 GAIT TRAINING THERAPY: CPT

## 2018-03-29 PROCEDURE — 94002 VENT MGMT INPAT INIT DAY: CPT

## 2018-03-30 VITALS
TEMPERATURE: 97.34 F | DIASTOLIC BLOOD PRESSURE: 72 MMHG | OXYGEN SATURATION: 98 % | SYSTOLIC BLOOD PRESSURE: 115 MMHG | RESPIRATION RATE: 18 BRPM | HEART RATE: 92 BPM

## 2018-03-30 VITALS
TEMPERATURE: 99.2 F | HEART RATE: 98 BPM | OXYGEN SATURATION: 94 % | RESPIRATION RATE: 18 BRPM | SYSTOLIC BLOOD PRESSURE: 125 MMHG | DIASTOLIC BLOOD PRESSURE: 65 MMHG

## 2018-03-30 VITALS
OXYGEN SATURATION: 96 % | HEART RATE: 71 BPM | DIASTOLIC BLOOD PRESSURE: 65 MMHG | SYSTOLIC BLOOD PRESSURE: 125 MMHG | RESPIRATION RATE: 16 BRPM | TEMPERATURE: 98.4 F

## 2018-03-30 VITALS
SYSTOLIC BLOOD PRESSURE: 125 MMHG | HEART RATE: 92 BPM | TEMPERATURE: 97.8 F | DIASTOLIC BLOOD PRESSURE: 65 MMHG | RESPIRATION RATE: 16 BRPM | OXYGEN SATURATION: 91 %

## 2018-03-30 VITALS
DIASTOLIC BLOOD PRESSURE: 59 MMHG | SYSTOLIC BLOOD PRESSURE: 104 MMHG | RESPIRATION RATE: 18 BRPM | HEART RATE: 95 BPM | OXYGEN SATURATION: 96 % | TEMPERATURE: 98 F

## 2018-03-30 VITALS
SYSTOLIC BLOOD PRESSURE: 103 MMHG | DIASTOLIC BLOOD PRESSURE: 61 MMHG | TEMPERATURE: 97.52 F | RESPIRATION RATE: 18 BRPM | OXYGEN SATURATION: 96 % | HEART RATE: 75 BPM

## 2018-03-30 VITALS — OXYGEN SATURATION: 95 % | RESPIRATION RATE: 12 BRPM | HEART RATE: 73 BPM

## 2018-03-30 VITALS
DIASTOLIC BLOOD PRESSURE: 65 MMHG | RESPIRATION RATE: 16 BRPM | SYSTOLIC BLOOD PRESSURE: 150 MMHG | HEART RATE: 80 BPM | OXYGEN SATURATION: 94 %

## 2018-03-30 VITALS
DIASTOLIC BLOOD PRESSURE: 65 MMHG | OXYGEN SATURATION: 92 % | RESPIRATION RATE: 18 BRPM | HEART RATE: 82 BPM | SYSTOLIC BLOOD PRESSURE: 125 MMHG

## 2018-03-30 VITALS
HEART RATE: 88 BPM | SYSTOLIC BLOOD PRESSURE: 162 MMHG | RESPIRATION RATE: 16 BRPM | DIASTOLIC BLOOD PRESSURE: 89 MMHG | OXYGEN SATURATION: 91 %

## 2018-03-30 VITALS
SYSTOLIC BLOOD PRESSURE: 125 MMHG | HEART RATE: 93 BPM | DIASTOLIC BLOOD PRESSURE: 81 MMHG | RESPIRATION RATE: 18 BRPM | OXYGEN SATURATION: 93 %

## 2018-03-30 VITALS
DIASTOLIC BLOOD PRESSURE: 67 MMHG | SYSTOLIC BLOOD PRESSURE: 122 MMHG | TEMPERATURE: 97.52 F | HEART RATE: 74 BPM | RESPIRATION RATE: 16 BRPM | OXYGEN SATURATION: 96 %

## 2018-03-30 VITALS
HEART RATE: 72 BPM | TEMPERATURE: 97.7 F | OXYGEN SATURATION: 95 % | RESPIRATION RATE: 18 BRPM | DIASTOLIC BLOOD PRESSURE: 74 MMHG | SYSTOLIC BLOOD PRESSURE: 134 MMHG

## 2018-03-30 VITALS
SYSTOLIC BLOOD PRESSURE: 143 MMHG | HEART RATE: 77 BPM | RESPIRATION RATE: 16 BRPM | OXYGEN SATURATION: 94 % | DIASTOLIC BLOOD PRESSURE: 65 MMHG

## 2018-03-30 VITALS — SYSTOLIC BLOOD PRESSURE: 125 MMHG | DIASTOLIC BLOOD PRESSURE: 65 MMHG

## 2018-03-30 VITALS — OXYGEN SATURATION: 94 % | RESPIRATION RATE: 16 BRPM

## 2018-03-30 PROCEDURE — 0QSB04Z REPOSITION RIGHT LOWER FEMUR WITH INTERNAL FIXATION DEVICE, OPEN APPROACH: ICD-10-PCS | Performed by: ORTHOPAEDIC SURGERY

## 2018-03-31 VITALS
DIASTOLIC BLOOD PRESSURE: 66 MMHG | HEART RATE: 83 BPM | TEMPERATURE: 97.7 F | OXYGEN SATURATION: 99 % | SYSTOLIC BLOOD PRESSURE: 105 MMHG | RESPIRATION RATE: 18 BRPM

## 2018-03-31 VITALS
HEART RATE: 80 BPM | RESPIRATION RATE: 18 BRPM | DIASTOLIC BLOOD PRESSURE: 67 MMHG | TEMPERATURE: 98.1 F | SYSTOLIC BLOOD PRESSURE: 102 MMHG | OXYGEN SATURATION: 95 %

## 2018-03-31 VITALS
HEART RATE: 73 BPM | SYSTOLIC BLOOD PRESSURE: 114 MMHG | DIASTOLIC BLOOD PRESSURE: 70 MMHG | OXYGEN SATURATION: 95 % | RESPIRATION RATE: 20 BRPM

## 2018-03-31 VITALS
RESPIRATION RATE: 18 BRPM | TEMPERATURE: 98.96 F | OXYGEN SATURATION: 98 % | HEART RATE: 74 BPM | DIASTOLIC BLOOD PRESSURE: 61 MMHG | SYSTOLIC BLOOD PRESSURE: 118 MMHG

## 2018-03-31 VITALS — RESPIRATION RATE: 18 BRPM

## 2018-03-31 VITALS — TEMPERATURE: 98.5 F

## 2018-03-31 LAB
ANION GAP: 8 (ref 5–15)
BUN SERPL-MCNC: 14 MG/DL (ref 7–18)
BUN/CREAT RATIO: 13.6 RATIO (ref 10–20)
CALCIUM SERPL-MCNC: 8.4 MG/DL (ref 8.5–10.1)
CARBON DIOXIDE: 25 MMOL/L (ref 21–32)
CHLORIDE: 102 MMOL/L (ref 98–107)
DEPRECATED RDW RBC: 44.8 FL (ref 35.1–43.9)
ERYTHROCYTE [DISTWIDTH] IN BLOOD: 13.3 % (ref 11.6–14.6)
EST GLOM FILT RATE - AFR AMER: 99 ML/MIN (ref 60–?)
ESTIMATED CREATININE CLEARANCE: 91.55 ML/MIN
GLUCOSE: 96 MG/DL (ref 74–106)
HCT VFR BLD AUTO: 39.3 % (ref 40–54)
HEMOGLOBIN: 13.1 G/DL (ref 13–16.5)
HGB BLD-MCNC: 13.1 G/DL (ref 13–16.5)
MCV RBC: 95.6 FL (ref 80–94)
MEAN CORP HGB CONC: 33.3 G/GL (ref 32–36)
MEAN PLATELET VOL.: 9.6 FL (ref 6.2–12)
PLATELET # BLD: 359 K/MM3 (ref 150–450)
PLATELET COUNT: 359 K/MM3 (ref 150–450)
POTASSIUM: 3.9 MMOL/L (ref 3.5–5.1)
RBC # BLD AUTO: 4.11 M/MM3 (ref 4.6–6.2)
RBC DISTRIBUTION WIDTH CV: 13.3 % (ref 11.6–14.6)
RBC DISTRIBUTION WIDTH SD: 44.8 FL (ref 35.1–43.9)
SCAN INDICATED ON CBC? Y/N: NO
WBC # BLD AUTO: 11.8 K/MM3 (ref 4.4–11)
WHITE BLOOD COUNT: 11.8 K/MM3 (ref 4.4–11)

## 2018-04-01 VITALS
OXYGEN SATURATION: 94 % | TEMPERATURE: 98.9 F | SYSTOLIC BLOOD PRESSURE: 120 MMHG | DIASTOLIC BLOOD PRESSURE: 62 MMHG | HEART RATE: 79 BPM | RESPIRATION RATE: 16 BRPM

## 2018-04-01 VITALS
TEMPERATURE: 98.6 F | OXYGEN SATURATION: 97 % | RESPIRATION RATE: 18 BRPM | SYSTOLIC BLOOD PRESSURE: 117 MMHG | HEART RATE: 91 BPM | DIASTOLIC BLOOD PRESSURE: 74 MMHG

## 2018-04-01 VITALS
TEMPERATURE: 98.1 F | HEART RATE: 95 BPM | OXYGEN SATURATION: 97 % | SYSTOLIC BLOOD PRESSURE: 137 MMHG | DIASTOLIC BLOOD PRESSURE: 85 MMHG | RESPIRATION RATE: 18 BRPM

## 2018-04-01 VITALS
OXYGEN SATURATION: 97 % | TEMPERATURE: 98.2 F | RESPIRATION RATE: 18 BRPM | SYSTOLIC BLOOD PRESSURE: 142 MMHG | DIASTOLIC BLOOD PRESSURE: 77 MMHG | HEART RATE: 84 BPM

## 2018-04-01 LAB
ANION GAP: 6 (ref 5–15)
BUN SERPL-MCNC: 17 MG/DL (ref 7–18)
BUN/CREAT RATIO: 16.3 RATIO (ref 10–20)
CALCIUM SERPL-MCNC: 8.5 MG/DL (ref 8.5–10.1)
CARBON DIOXIDE: 27 MMOL/L (ref 21–32)
CHLORIDE: 103 MMOL/L (ref 98–107)
DEPRECATED RDW RBC: 46.8 FL (ref 35.1–43.9)
ERYTHROCYTE [DISTWIDTH] IN BLOOD: 13.4 % (ref 11.6–14.6)
EST GLOM FILT RATE - AFR AMER: 98 ML/MIN (ref 60–?)
ESTIMATED CREATININE CLEARANCE: 90.67 ML/MIN
GLUCOSE: 95 MG/DL (ref 74–106)
HCT VFR BLD AUTO: 40 % (ref 40–54)
HEMOGLOBIN: 13.3 G/DL (ref 13–16.5)
HGB BLD-MCNC: 13.3 G/DL (ref 13–16.5)
MCV RBC: 94.8 FL (ref 80–94)
MEAN CORP HGB CONC: 33.3 G/GL (ref 32–36)
MEAN PLATELET VOL.: 9 FL (ref 6.2–12)
PLATELET # BLD: 332 K/MM3 (ref 150–450)
PLATELET COUNT: 332 K/MM3 (ref 150–450)
POTASSIUM: 4 MMOL/L (ref 3.5–5.1)
RBC # BLD AUTO: 4.22 M/MM3 (ref 4.6–6.2)
RBC DISTRIBUTION WIDTH CV: 13.4 % (ref 11.6–14.6)
RBC DISTRIBUTION WIDTH SD: 46.8 FL (ref 35.1–43.9)
SCAN INDICATED ON CBC? Y/N: NO
WBC # BLD AUTO: 9.8 K/MM3 (ref 4.4–11)
WHITE BLOOD COUNT: 9.8 K/MM3 (ref 4.4–11)

## 2018-04-02 VITALS
TEMPERATURE: 98.24 F | HEART RATE: 90 BPM | DIASTOLIC BLOOD PRESSURE: 68 MMHG | SYSTOLIC BLOOD PRESSURE: 120 MMHG | OXYGEN SATURATION: 95 % | RESPIRATION RATE: 18 BRPM

## 2018-04-02 VITALS
RESPIRATION RATE: 18 BRPM | TEMPERATURE: 97.88 F | DIASTOLIC BLOOD PRESSURE: 70 MMHG | SYSTOLIC BLOOD PRESSURE: 120 MMHG | HEART RATE: 78 BPM | OXYGEN SATURATION: 96 %

## 2018-04-02 VITALS
HEART RATE: 90 BPM | SYSTOLIC BLOOD PRESSURE: 120 MMHG | TEMPERATURE: 98.24 F | RESPIRATION RATE: 18 BRPM | OXYGEN SATURATION: 95 % | DIASTOLIC BLOOD PRESSURE: 68 MMHG

## 2018-04-02 VITALS
HEART RATE: 95 BPM | DIASTOLIC BLOOD PRESSURE: 63 MMHG | RESPIRATION RATE: 18 BRPM | TEMPERATURE: 97.52 F | OXYGEN SATURATION: 94 % | SYSTOLIC BLOOD PRESSURE: 113 MMHG

## 2018-04-02 VITALS
TEMPERATURE: 96.08 F | DIASTOLIC BLOOD PRESSURE: 60 MMHG | RESPIRATION RATE: 18 BRPM | OXYGEN SATURATION: 96 % | SYSTOLIC BLOOD PRESSURE: 113 MMHG | HEART RATE: 81 BPM

## 2018-04-02 LAB
ANION GAP: 9 (ref 5–15)
BUN SERPL-MCNC: 15 MG/DL (ref 7–18)
BUN/CREAT RATIO: 16.1 RATIO (ref 10–20)
CALCIUM SERPL-MCNC: 8.9 MG/DL (ref 8.5–10.1)
CARBON DIOXIDE: 24 MMOL/L (ref 21–32)
CHLORIDE: 101 MMOL/L (ref 98–107)
DEPRECATED RDW RBC: 46.8 FL (ref 35.1–43.9)
ERYTHROCYTE [DISTWIDTH] IN BLOOD: 13.3 % (ref 11.6–14.6)
EST GLOM FILT RATE - AFR AMER: 112 ML/MIN (ref 60–?)
ESTIMATED CREATININE CLEARANCE: 101.39 ML/MIN
GLUCOSE: 93 MG/DL (ref 74–106)
HCT VFR BLD AUTO: 41.6 % (ref 40–54)
HEMOGLOBIN: 13.6 G/DL (ref 13–16.5)
HGB BLD-MCNC: 13.6 G/DL (ref 13–16.5)
MCV RBC: 96.1 FL (ref 80–94)
MEAN CORP HGB CONC: 32.7 G/GL (ref 32–36)
MEAN PLATELET VOL.: 9.7 FL (ref 6.2–12)
PLATELET # BLD: 357 K/MM3 (ref 150–450)
PLATELET COUNT: 357 K/MM3 (ref 150–450)
POTASSIUM: 4.1 MMOL/L (ref 3.5–5.1)
RBC # BLD AUTO: 4.33 M/MM3 (ref 4.6–6.2)
RBC DISTRIBUTION WIDTH CV: 13.3 % (ref 11.6–14.6)
RBC DISTRIBUTION WIDTH SD: 46.8 FL (ref 35.1–43.9)
SCAN INDICATED ON CBC? Y/N: NO
WBC # BLD AUTO: 8.9 K/MM3 (ref 4.4–11)
WHITE BLOOD COUNT: 8.9 K/MM3 (ref 4.4–11)

## 2018-04-03 VITALS
DIASTOLIC BLOOD PRESSURE: 58 MMHG | SYSTOLIC BLOOD PRESSURE: 100 MMHG | HEART RATE: 80 BPM | TEMPERATURE: 98.3 F | RESPIRATION RATE: 20 BRPM | OXYGEN SATURATION: 94 %

## 2018-04-03 VITALS
HEART RATE: 82 BPM | OXYGEN SATURATION: 97 % | TEMPERATURE: 97.52 F | DIASTOLIC BLOOD PRESSURE: 66 MMHG | SYSTOLIC BLOOD PRESSURE: 106 MMHG | RESPIRATION RATE: 18 BRPM

## 2018-07-02 ENCOUNTER — HOSPITAL ENCOUNTER (OUTPATIENT)
Dept: HOSPITAL 100 - SDC | Age: 48
Discharge: HOME | End: 2018-07-02
Payer: MEDICAID

## 2018-07-02 VITALS
RESPIRATION RATE: 16 BRPM | DIASTOLIC BLOOD PRESSURE: 83 MMHG | HEART RATE: 78 BPM | SYSTOLIC BLOOD PRESSURE: 133 MMHG | OXYGEN SATURATION: 92 %

## 2018-07-02 VITALS
SYSTOLIC BLOOD PRESSURE: 133 MMHG | DIASTOLIC BLOOD PRESSURE: 87 MMHG | HEART RATE: 75 BPM | OXYGEN SATURATION: 98 % | RESPIRATION RATE: 16 BRPM | TEMPERATURE: 98.42 F

## 2018-07-02 VITALS
RESPIRATION RATE: 16 BRPM | OXYGEN SATURATION: 94 % | TEMPERATURE: 99.5 F | SYSTOLIC BLOOD PRESSURE: 131 MMHG | HEART RATE: 80 BPM | DIASTOLIC BLOOD PRESSURE: 87 MMHG

## 2018-07-02 VITALS
HEART RATE: 81 BPM | DIASTOLIC BLOOD PRESSURE: 54 MMHG | RESPIRATION RATE: 16 BRPM | OXYGEN SATURATION: 92 % | SYSTOLIC BLOOD PRESSURE: 133 MMHG | TEMPERATURE: 98.1 F

## 2018-07-02 VITALS — BODY MASS INDEX: 56 KG/M2

## 2018-07-02 VITALS — SYSTOLIC BLOOD PRESSURE: 133 MMHG | DIASTOLIC BLOOD PRESSURE: 87 MMHG

## 2018-07-02 VITALS
SYSTOLIC BLOOD PRESSURE: 117 MMHG | DIASTOLIC BLOOD PRESSURE: 66 MMHG | OXYGEN SATURATION: 95 % | HEART RATE: 83 BPM | RESPIRATION RATE: 18 BRPM

## 2018-07-02 DIAGNOSIS — I10: ICD-10-CM

## 2018-07-02 DIAGNOSIS — E11.9: ICD-10-CM

## 2018-07-02 DIAGNOSIS — Z79.01: ICD-10-CM

## 2018-07-02 DIAGNOSIS — Z79.84: ICD-10-CM

## 2018-07-02 DIAGNOSIS — M48.07: ICD-10-CM

## 2018-07-02 DIAGNOSIS — K21.9: ICD-10-CM

## 2018-07-02 DIAGNOSIS — Z79.899: ICD-10-CM

## 2018-07-02 DIAGNOSIS — M51.17: Primary | ICD-10-CM

## 2018-07-02 DIAGNOSIS — Z86.711: ICD-10-CM

## 2018-07-02 DIAGNOSIS — Z87.891: ICD-10-CM

## 2018-07-02 PROCEDURE — 62322 NJX INTERLAMINAR LMBR/SAC: CPT

## 2018-07-02 PROCEDURE — 64483 NJX AA&/STRD TFRM EPI L/S 1: CPT

## 2018-07-02 PROCEDURE — 82962 GLUCOSE BLOOD TEST: CPT

## 2018-07-02 PROCEDURE — 77003 FLUOROGUIDE FOR SPINE INJECT: CPT

## 2018-07-02 PROCEDURE — 3E0S3BZ INTRODUCTION OF ANESTHETIC AGENT INTO EPIDURAL SPACE, PERCUTANEOUS APPROACH: ICD-10-PCS | Performed by: ANESTHESIOLOGY

## 2018-08-20 ENCOUNTER — HOSPITAL ENCOUNTER (OUTPATIENT)
Dept: HOSPITAL 100 - SDC | Age: 48
Discharge: HOME | End: 2018-08-20
Payer: MEDICAID

## 2018-08-20 VITALS
OXYGEN SATURATION: 95 % | HEART RATE: 77 BPM | SYSTOLIC BLOOD PRESSURE: 119 MMHG | DIASTOLIC BLOOD PRESSURE: 67 MMHG | TEMPERATURE: 98.1 F | RESPIRATION RATE: 16 BRPM

## 2018-08-20 VITALS
TEMPERATURE: 98.06 F | DIASTOLIC BLOOD PRESSURE: 61 MMHG | SYSTOLIC BLOOD PRESSURE: 120 MMHG | RESPIRATION RATE: 16 BRPM | OXYGEN SATURATION: 92 % | HEART RATE: 82 BPM

## 2018-08-20 VITALS — SYSTOLIC BLOOD PRESSURE: 152 MMHG | DIASTOLIC BLOOD PRESSURE: 88 MMHG

## 2018-08-20 VITALS — BODY MASS INDEX: 56.9 KG/M2

## 2018-08-20 VITALS
HEART RATE: 78 BPM | OXYGEN SATURATION: 95 % | DIASTOLIC BLOOD PRESSURE: 63 MMHG | SYSTOLIC BLOOD PRESSURE: 92 MMHG | RESPIRATION RATE: 16 BRPM

## 2018-08-20 VITALS
RESPIRATION RATE: 16 BRPM | TEMPERATURE: 98.1 F | HEART RATE: 78 BPM | OXYGEN SATURATION: 98 % | SYSTOLIC BLOOD PRESSURE: 152 MMHG | DIASTOLIC BLOOD PRESSURE: 88 MMHG

## 2018-08-20 VITALS
DIASTOLIC BLOOD PRESSURE: 96 MMHG | OXYGEN SATURATION: 96 % | HEART RATE: 83 BPM | SYSTOLIC BLOOD PRESSURE: 152 MMHG | RESPIRATION RATE: 16 BRPM

## 2018-08-20 DIAGNOSIS — Z79.01: ICD-10-CM

## 2018-08-20 DIAGNOSIS — M46.96: ICD-10-CM

## 2018-08-20 DIAGNOSIS — Z79.84: ICD-10-CM

## 2018-08-20 DIAGNOSIS — M48.061: ICD-10-CM

## 2018-08-20 DIAGNOSIS — F41.9: ICD-10-CM

## 2018-08-20 DIAGNOSIS — F32.9: ICD-10-CM

## 2018-08-20 DIAGNOSIS — G47.30: ICD-10-CM

## 2018-08-20 DIAGNOSIS — M47.26: ICD-10-CM

## 2018-08-20 DIAGNOSIS — M47.817: Primary | ICD-10-CM

## 2018-08-20 DIAGNOSIS — Z86.711: ICD-10-CM

## 2018-08-20 DIAGNOSIS — M51.36: ICD-10-CM

## 2018-08-20 DIAGNOSIS — I10: ICD-10-CM

## 2018-08-20 DIAGNOSIS — Z96.641: ICD-10-CM

## 2018-08-20 DIAGNOSIS — E11.9: ICD-10-CM

## 2018-08-20 DIAGNOSIS — F17.200: ICD-10-CM

## 2018-08-20 DIAGNOSIS — Z79.891: ICD-10-CM

## 2018-08-20 DIAGNOSIS — Z79.899: ICD-10-CM

## 2018-08-20 DIAGNOSIS — M51.37: ICD-10-CM

## 2018-08-20 DIAGNOSIS — K21.9: ICD-10-CM

## 2018-08-20 PROCEDURE — 64494 INJ PARAVERT F JNT L/S 2 LEV: CPT

## 2018-08-20 PROCEDURE — 64493 INJ PARAVERT F JNT L/S 1 LEV: CPT

## 2018-08-20 PROCEDURE — 64495 INJ PARAVERT F JNT L/S 3 LEV: CPT

## 2018-08-20 PROCEDURE — 82962 GLUCOSE BLOOD TEST: CPT

## 2018-08-20 PROCEDURE — 3E0T3BZ INTRODUCTION OF ANESTHETIC AGENT INTO PERIPHERAL NERVES AND PLEXI, PERCUTANEOUS APPROACH: ICD-10-PCS | Performed by: ANESTHESIOLOGY

## 2018-08-20 PROCEDURE — 64483 NJX AA&/STRD TFRM EPI L/S 1: CPT

## 2018-08-20 PROCEDURE — 72100 X-RAY EXAM L-S SPINE 2/3 VWS: CPT

## 2018-10-01 ENCOUNTER — HOSPITAL ENCOUNTER (OUTPATIENT)
Age: 48
End: 2018-10-01
Payer: MEDICAID

## 2018-10-01 DIAGNOSIS — M51.37: ICD-10-CM

## 2018-10-01 DIAGNOSIS — M51.36: ICD-10-CM

## 2018-10-01 DIAGNOSIS — M46.96: Primary | ICD-10-CM

## 2018-10-01 DIAGNOSIS — M48.061: ICD-10-CM

## 2018-10-01 DIAGNOSIS — M47.26: ICD-10-CM

## 2018-10-01 PROCEDURE — 70030 X-RAY EYE FOR FOREIGN BODY: CPT

## 2018-10-01 PROCEDURE — 72148 MRI LUMBAR SPINE W/O DYE: CPT

## 2018-11-05 ENCOUNTER — HOSPITAL ENCOUNTER (OUTPATIENT)
Dept: HOSPITAL 100 - SDC | Age: 48
Discharge: HOME | End: 2018-11-05
Payer: MEDICAID

## 2018-11-05 VITALS
OXYGEN SATURATION: 97 % | TEMPERATURE: 98.5 F | DIASTOLIC BLOOD PRESSURE: 89 MMHG | SYSTOLIC BLOOD PRESSURE: 130 MMHG | HEART RATE: 77 BPM | RESPIRATION RATE: 20 BRPM

## 2018-11-05 VITALS
HEART RATE: 80 BPM | TEMPERATURE: 97.88 F | OXYGEN SATURATION: 94 % | RESPIRATION RATE: 16 BRPM | DIASTOLIC BLOOD PRESSURE: 89 MMHG | SYSTOLIC BLOOD PRESSURE: 130 MMHG

## 2018-11-05 VITALS
OXYGEN SATURATION: 96 % | RESPIRATION RATE: 16 BRPM | DIASTOLIC BLOOD PRESSURE: 89 MMHG | SYSTOLIC BLOOD PRESSURE: 130 MMHG | HEART RATE: 77 BPM

## 2018-11-05 VITALS
RESPIRATION RATE: 16 BRPM | SYSTOLIC BLOOD PRESSURE: 135 MMHG | OXYGEN SATURATION: 96 % | DIASTOLIC BLOOD PRESSURE: 78 MMHG | HEART RATE: 78 BPM

## 2018-11-05 VITALS
OXYGEN SATURATION: 94 % | TEMPERATURE: 98.06 F | SYSTOLIC BLOOD PRESSURE: 120 MMHG | RESPIRATION RATE: 16 BRPM | HEART RATE: 76 BPM | DIASTOLIC BLOOD PRESSURE: 77 MMHG

## 2018-11-05 VITALS — DIASTOLIC BLOOD PRESSURE: 89 MMHG | SYSTOLIC BLOOD PRESSURE: 130 MMHG

## 2018-11-05 VITALS — BODY MASS INDEX: 58.7 KG/M2

## 2018-11-05 DIAGNOSIS — M46.86: ICD-10-CM

## 2018-11-05 DIAGNOSIS — F17.200: ICD-10-CM

## 2018-11-05 DIAGNOSIS — Z86.711: ICD-10-CM

## 2018-11-05 DIAGNOSIS — I10: ICD-10-CM

## 2018-11-05 DIAGNOSIS — Z79.01: ICD-10-CM

## 2018-11-05 DIAGNOSIS — F32.9: ICD-10-CM

## 2018-11-05 DIAGNOSIS — E66.9: ICD-10-CM

## 2018-11-05 DIAGNOSIS — F41.9: ICD-10-CM

## 2018-11-05 DIAGNOSIS — Z79.4: ICD-10-CM

## 2018-11-05 DIAGNOSIS — Z79.899: ICD-10-CM

## 2018-11-05 DIAGNOSIS — K21.9: ICD-10-CM

## 2018-11-05 DIAGNOSIS — M51.37: ICD-10-CM

## 2018-11-05 DIAGNOSIS — M47.897: Primary | ICD-10-CM

## 2018-11-05 PROCEDURE — 64483 NJX AA&/STRD TFRM EPI L/S 1: CPT

## 2018-11-05 PROCEDURE — 64495 INJ PARAVERT F JNT L/S 3 LEV: CPT

## 2018-11-05 PROCEDURE — 82962 GLUCOSE BLOOD TEST: CPT

## 2018-11-05 PROCEDURE — 72110 X-RAY EXAM L-2 SPINE 4/>VWS: CPT

## 2018-11-05 PROCEDURE — 3E0T3BZ INTRODUCTION OF ANESTHETIC AGENT INTO PERIPHERAL NERVES AND PLEXI, PERCUTANEOUS APPROACH: ICD-10-PCS | Performed by: ANESTHESIOLOGY

## 2018-11-05 PROCEDURE — 64494 INJ PARAVERT F JNT L/S 2 LEV: CPT

## 2018-11-05 PROCEDURE — 64493 INJ PARAVERT F JNT L/S 1 LEV: CPT

## 2018-12-17 ENCOUNTER — HOSPITAL ENCOUNTER (OUTPATIENT)
Dept: HOSPITAL 100 - SDC | Age: 48
Discharge: HOME | End: 2018-12-17
Payer: MEDICAID

## 2018-12-17 VITALS
HEART RATE: 78 BPM | DIASTOLIC BLOOD PRESSURE: 81 MMHG | RESPIRATION RATE: 18 BRPM | OXYGEN SATURATION: 95 % | SYSTOLIC BLOOD PRESSURE: 141 MMHG

## 2018-12-17 VITALS
DIASTOLIC BLOOD PRESSURE: 81 MMHG | TEMPERATURE: 97.5 F | SYSTOLIC BLOOD PRESSURE: 145 MMHG | HEART RATE: 77 BPM | OXYGEN SATURATION: 95 % | RESPIRATION RATE: 18 BRPM

## 2018-12-17 VITALS
DIASTOLIC BLOOD PRESSURE: 89 MMHG | RESPIRATION RATE: 18 BRPM | HEART RATE: 77 BPM | SYSTOLIC BLOOD PRESSURE: 141 MMHG | OXYGEN SATURATION: 95 %

## 2018-12-17 VITALS
DIASTOLIC BLOOD PRESSURE: 49 MMHG | SYSTOLIC BLOOD PRESSURE: 141 MMHG | RESPIRATION RATE: 18 BRPM | HEART RATE: 78 BPM | OXYGEN SATURATION: 93 % | TEMPERATURE: 97.88 F

## 2018-12-17 VITALS
HEART RATE: 77 BPM | TEMPERATURE: 98.96 F | OXYGEN SATURATION: 97 % | RESPIRATION RATE: 16 BRPM | SYSTOLIC BLOOD PRESSURE: 141 MMHG | DIASTOLIC BLOOD PRESSURE: 81 MMHG

## 2018-12-17 VITALS — DIASTOLIC BLOOD PRESSURE: 81 MMHG | SYSTOLIC BLOOD PRESSURE: 141 MMHG

## 2018-12-17 VITALS — BODY MASS INDEX: 58.8 KG/M2

## 2018-12-17 DIAGNOSIS — Z79.4: ICD-10-CM

## 2018-12-17 DIAGNOSIS — K21.9: ICD-10-CM

## 2018-12-17 DIAGNOSIS — M51.37: ICD-10-CM

## 2018-12-17 DIAGNOSIS — M46.96: ICD-10-CM

## 2018-12-17 DIAGNOSIS — Z79.891: ICD-10-CM

## 2018-12-17 DIAGNOSIS — I10: ICD-10-CM

## 2018-12-17 DIAGNOSIS — Z96.641: ICD-10-CM

## 2018-12-17 DIAGNOSIS — Z79.899: ICD-10-CM

## 2018-12-17 DIAGNOSIS — M47.817: Primary | ICD-10-CM

## 2018-12-17 DIAGNOSIS — F41.9: ICD-10-CM

## 2018-12-17 DIAGNOSIS — E11.9: ICD-10-CM

## 2018-12-17 DIAGNOSIS — Z86.711: ICD-10-CM

## 2018-12-17 DIAGNOSIS — F17.210: ICD-10-CM

## 2018-12-17 DIAGNOSIS — F32.9: ICD-10-CM

## 2018-12-17 DIAGNOSIS — Z79.01: ICD-10-CM

## 2018-12-17 PROCEDURE — 64635 DESTROY LUMB/SAC FACET JNT: CPT

## 2018-12-17 PROCEDURE — 76000 FLUOROSCOPY <1 HR PHYS/QHP: CPT

## 2018-12-17 PROCEDURE — 72110 X-RAY EXAM L-2 SPINE 4/>VWS: CPT

## 2018-12-17 PROCEDURE — 64636 DESTROY L/S FACET JNT ADDL: CPT

## 2018-12-17 PROCEDURE — 82962 GLUCOSE BLOOD TEST: CPT

## 2019-02-04 ENCOUNTER — HOSPITAL ENCOUNTER (OUTPATIENT)
Dept: HOSPITAL 100 - SDC | Age: 49
Discharge: HOME | End: 2019-02-04
Payer: MEDICAID

## 2019-02-04 VITALS
RESPIRATION RATE: 16 BRPM | HEART RATE: 77 BPM | TEMPERATURE: 97.8 F | OXYGEN SATURATION: 93 % | DIASTOLIC BLOOD PRESSURE: 58 MMHG | SYSTOLIC BLOOD PRESSURE: 149 MMHG

## 2019-02-04 VITALS
HEART RATE: 79 BPM | OXYGEN SATURATION: 94 % | TEMPERATURE: 97.3 F | SYSTOLIC BLOOD PRESSURE: 122 MMHG | RESPIRATION RATE: 16 BRPM | DIASTOLIC BLOOD PRESSURE: 85 MMHG

## 2019-02-04 VITALS
OXYGEN SATURATION: 98 % | DIASTOLIC BLOOD PRESSURE: 85 MMHG | RESPIRATION RATE: 18 BRPM | TEMPERATURE: 98.7 F | SYSTOLIC BLOOD PRESSURE: 149 MMHG | HEART RATE: 80 BPM

## 2019-02-04 VITALS
OXYGEN SATURATION: 92 % | SYSTOLIC BLOOD PRESSURE: 149 MMHG | DIASTOLIC BLOOD PRESSURE: 76 MMHG | HEART RATE: 80 BPM | RESPIRATION RATE: 16 BRPM

## 2019-02-04 VITALS — BODY MASS INDEX: 59.3 KG/M2 | BODY MASS INDEX: 58.8 KG/M2

## 2019-02-04 VITALS
HEART RATE: 77 BPM | OXYGEN SATURATION: 92 % | RESPIRATION RATE: 16 BRPM | SYSTOLIC BLOOD PRESSURE: 149 MMHG | DIASTOLIC BLOOD PRESSURE: 70 MMHG

## 2019-02-04 VITALS — SYSTOLIC BLOOD PRESSURE: 149 MMHG | DIASTOLIC BLOOD PRESSURE: 85 MMHG

## 2019-02-04 DIAGNOSIS — F17.200: ICD-10-CM

## 2019-02-04 DIAGNOSIS — M51.17: Primary | ICD-10-CM

## 2019-02-04 DIAGNOSIS — Z79.4: ICD-10-CM

## 2019-02-04 DIAGNOSIS — Z79.899: ICD-10-CM

## 2019-02-04 DIAGNOSIS — M48.07: ICD-10-CM

## 2019-02-04 DIAGNOSIS — I25.10: ICD-10-CM

## 2019-02-04 DIAGNOSIS — I10: ICD-10-CM

## 2019-02-04 DIAGNOSIS — K21.9: ICD-10-CM

## 2019-02-04 PROCEDURE — 3E0S3BZ INTRODUCTION OF ANESTHETIC AGENT INTO EPIDURAL SPACE, PERCUTANEOUS APPROACH: ICD-10-PCS | Performed by: ANESTHESIOLOGY

## 2019-02-04 PROCEDURE — 77003 FLUOROGUIDE FOR SPINE INJECT: CPT

## 2019-02-04 PROCEDURE — 62323 NJX INTERLAMINAR LMBR/SAC: CPT

## 2019-02-04 PROCEDURE — 64483 NJX AA&/STRD TFRM EPI L/S 1: CPT

## 2019-02-04 PROCEDURE — 82962 GLUCOSE BLOOD TEST: CPT

## 2019-03-18 ENCOUNTER — HOSPITAL ENCOUNTER (OUTPATIENT)
Dept: HOSPITAL 100 - SDC | Age: 49
Discharge: HOME | End: 2019-03-18
Payer: MEDICAID

## 2019-03-18 VITALS
HEART RATE: 79 BPM | SYSTOLIC BLOOD PRESSURE: 133 MMHG | DIASTOLIC BLOOD PRESSURE: 68 MMHG | OXYGEN SATURATION: 97 % | RESPIRATION RATE: 16 BRPM | TEMPERATURE: 98.3 F

## 2019-03-18 VITALS — BODY MASS INDEX: 57.6 KG/M2 | BODY MASS INDEX: 59.3 KG/M2 | BODY MASS INDEX: 57.9 KG/M2

## 2019-03-18 DIAGNOSIS — M46.86: ICD-10-CM

## 2019-03-18 DIAGNOSIS — M51.37: ICD-10-CM

## 2019-03-18 DIAGNOSIS — Z86.711: ICD-10-CM

## 2019-03-18 DIAGNOSIS — Z79.891: ICD-10-CM

## 2019-03-18 DIAGNOSIS — I10: ICD-10-CM

## 2019-03-18 DIAGNOSIS — M47.897: Primary | ICD-10-CM

## 2019-03-18 DIAGNOSIS — Z79.01: ICD-10-CM

## 2019-03-18 DIAGNOSIS — F17.210: ICD-10-CM

## 2019-03-18 DIAGNOSIS — Z79.899: ICD-10-CM

## 2019-03-18 LAB
ALANINE AMINOTRANSFER ALT/SGPT: 63 U/L (ref 16–61)
ALBUMIN SERPL-MCNC: 3.1 G/DL (ref 3.2–5)
ALKALINE PHOSPHATASE: 74 U/L (ref 45–117)
AST(SGOT): 63 U/L (ref 15–37)
BILIRUB DIRECT SERPL-MCNC: 0.11 MG/DL (ref 0–0.3)
CHOLEST SERPL-MCNC: 152 MG/DL
GLOBULIN: 4.6 G/DL (ref 2.2–4.2)
TRIGLYCERIDES: 139 MG/DL
VLDLC SERPL-MCNC: 28 MG/DL (ref 5–40)

## 2019-03-18 PROCEDURE — 64493 INJ PARAVERT F JNT L/S 1 LEV: CPT

## 2019-03-18 PROCEDURE — 64495 INJ PARAVERT F JNT L/S 3 LEV: CPT

## 2019-03-18 PROCEDURE — 64494 INJ PARAVERT F JNT L/S 2 LEV: CPT

## 2019-03-18 PROCEDURE — 64483 NJX AA&/STRD TFRM EPI L/S 1: CPT

## 2019-03-18 PROCEDURE — 82962 GLUCOSE BLOOD TEST: CPT

## 2019-03-18 PROCEDURE — 72100 X-RAY EXAM L-S SPINE 2/3 VWS: CPT

## 2019-03-18 PROCEDURE — 3E0T3BZ INTRODUCTION OF ANESTHETIC AGENT INTO PERIPHERAL NERVES AND PLEXI, PERCUTANEOUS APPROACH: ICD-10-PCS | Performed by: ANESTHESIOLOGY

## 2019-03-19 ENCOUNTER — HOSPITAL ENCOUNTER (OUTPATIENT)
Age: 49
End: 2019-03-19
Payer: MEDICAID

## 2019-03-19 VITALS — BODY MASS INDEX: 57.6 KG/M2 | BODY MASS INDEX: 57.9 KG/M2

## 2019-03-19 DIAGNOSIS — E11.9: ICD-10-CM

## 2019-03-19 DIAGNOSIS — Z86.711: ICD-10-CM

## 2019-03-19 DIAGNOSIS — R07.9: ICD-10-CM

## 2019-03-19 DIAGNOSIS — I27.20: Primary | ICD-10-CM

## 2019-03-19 DIAGNOSIS — R06.00: ICD-10-CM

## 2019-03-19 DIAGNOSIS — R06.09: ICD-10-CM

## 2019-03-19 DIAGNOSIS — Z79.4: ICD-10-CM

## 2019-03-19 DIAGNOSIS — G47.33: ICD-10-CM

## 2019-03-19 DIAGNOSIS — F17.201: ICD-10-CM

## 2019-03-19 LAB
CREATININE FINGERSTICK: 1.2 MG/DL (ref 0.7–1.3)
EGFR FINGERSTICK: > 60 ML/MIN (ref 60–?)

## 2019-03-19 PROCEDURE — 71275 CT ANGIOGRAPHY CHEST: CPT

## 2019-03-19 PROCEDURE — 36415 COLL VENOUS BLD VENIPUNCTURE: CPT

## 2019-03-19 PROCEDURE — 80076 HEPATIC FUNCTION PANEL: CPT

## 2019-03-19 PROCEDURE — 80061 LIPID PANEL: CPT

## 2019-03-22 ENCOUNTER — HOSPITAL ENCOUNTER (OUTPATIENT)
Age: 49
End: 2019-03-22
Payer: MEDICAID

## 2019-03-22 VITALS — BODY MASS INDEX: 57.9 KG/M2 | BODY MASS INDEX: 57.6 KG/M2

## 2019-03-22 DIAGNOSIS — R06.09: ICD-10-CM

## 2019-03-22 DIAGNOSIS — I27.20: Primary | ICD-10-CM

## 2019-03-22 DIAGNOSIS — R07.9: ICD-10-CM

## 2019-03-22 DIAGNOSIS — Z86.711: ICD-10-CM

## 2019-03-22 PROCEDURE — C8929 TTE W OR WO FOL WCON,DOPPLER: HCPCS

## 2019-03-22 PROCEDURE — A4216 STERILE WATER/SALINE, 10 ML: HCPCS

## 2019-03-22 PROCEDURE — 93306 TTE W/DOPPLER COMPLETE: CPT

## 2019-04-02 ENCOUNTER — HOSPITAL ENCOUNTER (OUTPATIENT)
Age: 49
End: 2019-04-02
Payer: MEDICAID

## 2019-04-02 VITALS — BODY MASS INDEX: 57.9 KG/M2 | BODY MASS INDEX: 58.3 KG/M2

## 2019-04-02 DIAGNOSIS — R07.9: ICD-10-CM

## 2019-04-02 DIAGNOSIS — R06.09: ICD-10-CM

## 2019-04-02 DIAGNOSIS — I51.7: Primary | ICD-10-CM

## 2019-04-02 DIAGNOSIS — I27.20: ICD-10-CM

## 2019-04-02 PROCEDURE — A4216 STERILE WATER/SALINE, 10 ML: HCPCS

## 2019-04-02 PROCEDURE — 93017 CV STRESS TEST TRACING ONLY: CPT

## 2019-04-02 PROCEDURE — 93350 STRESS TTE ONLY: CPT

## 2019-04-02 PROCEDURE — C8928 TTE W OR W/O FOL W/CON,STRES: HCPCS

## 2019-04-15 ENCOUNTER — HOSPITAL ENCOUNTER (OUTPATIENT)
Dept: HOSPITAL 100 - SDC | Age: 49
Discharge: HOME | End: 2019-04-15
Payer: MEDICAID

## 2019-04-15 VITALS
RESPIRATION RATE: 16 BRPM | SYSTOLIC BLOOD PRESSURE: 142 MMHG | DIASTOLIC BLOOD PRESSURE: 82 MMHG | OXYGEN SATURATION: 93 % | HEART RATE: 77 BPM | TEMPERATURE: 98.06 F

## 2019-04-15 VITALS
OXYGEN SATURATION: 97 % | SYSTOLIC BLOOD PRESSURE: 142 MMHG | DIASTOLIC BLOOD PRESSURE: 54 MMHG | HEART RATE: 74 BPM | RESPIRATION RATE: 16 BRPM

## 2019-04-15 VITALS
SYSTOLIC BLOOD PRESSURE: 142 MMHG | HEART RATE: 74 BPM | DIASTOLIC BLOOD PRESSURE: 82 MMHG | OXYGEN SATURATION: 96 % | RESPIRATION RATE: 16 BRPM

## 2019-04-15 VITALS
HEART RATE: 69 BPM | SYSTOLIC BLOOD PRESSURE: 142 MMHG | RESPIRATION RATE: 18 BRPM | DIASTOLIC BLOOD PRESSURE: 82 MMHG | TEMPERATURE: 98.06 F | OXYGEN SATURATION: 97 %

## 2019-04-15 VITALS
DIASTOLIC BLOOD PRESSURE: 82 MMHG | OXYGEN SATURATION: 96 % | HEART RATE: 73 BPM | SYSTOLIC BLOOD PRESSURE: 142 MMHG | RESPIRATION RATE: 16 BRPM

## 2019-04-15 VITALS
SYSTOLIC BLOOD PRESSURE: 142 MMHG | TEMPERATURE: 97.7 F | RESPIRATION RATE: 16 BRPM | OXYGEN SATURATION: 98 % | HEART RATE: 72 BPM | DIASTOLIC BLOOD PRESSURE: 56 MMHG

## 2019-04-15 VITALS — SYSTOLIC BLOOD PRESSURE: 142 MMHG | DIASTOLIC BLOOD PRESSURE: 82 MMHG

## 2019-04-15 VITALS — BODY MASS INDEX: 58.8 KG/M2 | BODY MASS INDEX: 58.6 KG/M2

## 2019-04-15 DIAGNOSIS — Z79.899: ICD-10-CM

## 2019-04-15 DIAGNOSIS — F41.9: ICD-10-CM

## 2019-04-15 DIAGNOSIS — M46.86: ICD-10-CM

## 2019-04-15 DIAGNOSIS — I10: ICD-10-CM

## 2019-04-15 DIAGNOSIS — F32.9: ICD-10-CM

## 2019-04-15 DIAGNOSIS — K21.9: ICD-10-CM

## 2019-04-15 DIAGNOSIS — Z86.711: ICD-10-CM

## 2019-04-15 DIAGNOSIS — Z79.4: ICD-10-CM

## 2019-04-15 DIAGNOSIS — M51.37: ICD-10-CM

## 2019-04-15 DIAGNOSIS — Z79.891: ICD-10-CM

## 2019-04-15 DIAGNOSIS — Z79.84: ICD-10-CM

## 2019-04-15 DIAGNOSIS — Z79.01: ICD-10-CM

## 2019-04-15 DIAGNOSIS — E11.9: ICD-10-CM

## 2019-04-15 DIAGNOSIS — M47.897: Primary | ICD-10-CM

## 2019-04-15 DIAGNOSIS — Z87.891: ICD-10-CM

## 2019-04-15 PROCEDURE — 3E0T3BZ INTRODUCTION OF ANESTHETIC AGENT INTO PERIPHERAL NERVES AND PLEXI, PERCUTANEOUS APPROACH: ICD-10-PCS | Performed by: ANESTHESIOLOGY

## 2019-04-15 PROCEDURE — 82962 GLUCOSE BLOOD TEST: CPT

## 2019-04-15 PROCEDURE — 64493 INJ PARAVERT F JNT L/S 1 LEV: CPT

## 2019-04-15 PROCEDURE — 64494 INJ PARAVERT F JNT L/S 2 LEV: CPT

## 2019-04-15 PROCEDURE — 72110 X-RAY EXAM L-2 SPINE 4/>VWS: CPT

## 2019-04-15 PROCEDURE — 64483 NJX AA&/STRD TFRM EPI L/S 1: CPT

## 2019-04-15 PROCEDURE — 64495 INJ PARAVERT F JNT L/S 3 LEV: CPT

## 2019-04-23 ENCOUNTER — HOSPITAL ENCOUNTER (OUTPATIENT)
Age: 49
End: 2019-04-23
Payer: MEDICAID

## 2019-04-23 VITALS — BODY MASS INDEX: 58.8 KG/M2 | BODY MASS INDEX: 58.6 KG/M2

## 2019-04-23 DIAGNOSIS — Z86.718: Primary | ICD-10-CM

## 2019-04-23 PROCEDURE — 94726 PLETHYSMOGRAPHY LUNG VOLUMES: CPT

## 2019-04-23 PROCEDURE — 94060 EVALUATION OF WHEEZING: CPT

## 2019-04-23 PROCEDURE — 94729 DIFFUSING CAPACITY: CPT

## 2019-07-01 ENCOUNTER — HOSPITAL ENCOUNTER (OUTPATIENT)
Dept: HOSPITAL 100 - SDC | Age: 49
Discharge: HOME | End: 2019-07-01
Payer: MEDICAID

## 2019-07-01 VITALS
TEMPERATURE: 98.1 F | SYSTOLIC BLOOD PRESSURE: 155 MMHG | RESPIRATION RATE: 16 BRPM | HEART RATE: 74 BPM | OXYGEN SATURATION: 92 % | DIASTOLIC BLOOD PRESSURE: 69 MMHG

## 2019-07-01 VITALS
TEMPERATURE: 97.6 F | HEART RATE: 69 BPM | RESPIRATION RATE: 16 BRPM | DIASTOLIC BLOOD PRESSURE: 90 MMHG | OXYGEN SATURATION: 97 % | SYSTOLIC BLOOD PRESSURE: 155 MMHG

## 2019-07-01 VITALS — DIASTOLIC BLOOD PRESSURE: 82 MMHG | SYSTOLIC BLOOD PRESSURE: 155 MMHG

## 2019-07-01 VITALS
RESPIRATION RATE: 16 BRPM | OXYGEN SATURATION: 96 % | DIASTOLIC BLOOD PRESSURE: 82 MMHG | HEART RATE: 70 BPM | SYSTOLIC BLOOD PRESSURE: 123 MMHG

## 2019-07-01 VITALS
SYSTOLIC BLOOD PRESSURE: 155 MMHG | DIASTOLIC BLOOD PRESSURE: 82 MMHG | RESPIRATION RATE: 16 BRPM | OXYGEN SATURATION: 97 % | HEART RATE: 69 BPM

## 2019-07-01 VITALS
OXYGEN SATURATION: 96 % | DIASTOLIC BLOOD PRESSURE: 82 MMHG | SYSTOLIC BLOOD PRESSURE: 155 MMHG | HEART RATE: 68 BPM | TEMPERATURE: 98.78 F | RESPIRATION RATE: 16 BRPM

## 2019-07-01 VITALS — BODY MASS INDEX: 58.7 KG/M2 | BODY MASS INDEX: 57.6 KG/M2

## 2019-07-01 VITALS
SYSTOLIC BLOOD PRESSURE: 141 MMHG | DIASTOLIC BLOOD PRESSURE: 82 MMHG | HEART RATE: 70 BPM | RESPIRATION RATE: 16 BRPM | OXYGEN SATURATION: 96 %

## 2019-07-01 VITALS
DIASTOLIC BLOOD PRESSURE: 82 MMHG | SYSTOLIC BLOOD PRESSURE: 155 MMHG | RESPIRATION RATE: 16 BRPM | OXYGEN SATURATION: 97 % | HEART RATE: 71 BPM

## 2019-07-01 DIAGNOSIS — Z79.84: ICD-10-CM

## 2019-07-01 DIAGNOSIS — Z86.711: ICD-10-CM

## 2019-07-01 DIAGNOSIS — Z79.01: ICD-10-CM

## 2019-07-01 DIAGNOSIS — Z79.891: ICD-10-CM

## 2019-07-01 DIAGNOSIS — K76.0: ICD-10-CM

## 2019-07-01 DIAGNOSIS — M47.817: Primary | ICD-10-CM

## 2019-07-01 DIAGNOSIS — M51.37: ICD-10-CM

## 2019-07-01 DIAGNOSIS — K21.9: ICD-10-CM

## 2019-07-01 DIAGNOSIS — Z79.899: ICD-10-CM

## 2019-07-01 DIAGNOSIS — Z87.891: ICD-10-CM

## 2019-07-01 DIAGNOSIS — Z79.4: ICD-10-CM

## 2019-07-01 DIAGNOSIS — I10: ICD-10-CM

## 2019-07-01 DIAGNOSIS — M46.86: ICD-10-CM

## 2019-07-01 DIAGNOSIS — F32.9: ICD-10-CM

## 2019-07-01 DIAGNOSIS — C67.2: ICD-10-CM

## 2019-07-01 DIAGNOSIS — F41.9: ICD-10-CM

## 2019-07-01 DIAGNOSIS — E11.9: ICD-10-CM

## 2019-07-01 PROCEDURE — 3E0T3BZ INTRODUCTION OF ANESTHETIC AGENT INTO PERIPHERAL NERVES AND PLEXI, PERCUTANEOUS APPROACH: ICD-10-PCS | Performed by: ANESTHESIOLOGY

## 2019-07-01 PROCEDURE — 64493 INJ PARAVERT F JNT L/S 1 LEV: CPT

## 2019-07-01 PROCEDURE — 64495 INJ PARAVERT F JNT L/S 3 LEV: CPT

## 2019-07-01 PROCEDURE — 72110 X-RAY EXAM L-2 SPINE 4/>VWS: CPT

## 2019-07-01 PROCEDURE — 64494 INJ PARAVERT F JNT L/S 2 LEV: CPT

## 2019-07-01 PROCEDURE — 82962 GLUCOSE BLOOD TEST: CPT

## 2019-07-01 PROCEDURE — 64483 NJX AA&/STRD TFRM EPI L/S 1: CPT

## 2019-08-19 ENCOUNTER — HOSPITAL ENCOUNTER (OUTPATIENT)
Dept: HOSPITAL 100 - SDC | Age: 49
Discharge: HOME | End: 2019-08-19
Payer: MEDICAID

## 2019-08-19 VITALS
HEART RATE: 70 BPM | DIASTOLIC BLOOD PRESSURE: 76 MMHG | SYSTOLIC BLOOD PRESSURE: 149 MMHG | RESPIRATION RATE: 16 BRPM | OXYGEN SATURATION: 94 %

## 2019-08-19 VITALS
HEART RATE: 74 BPM | TEMPERATURE: 97.52 F | RESPIRATION RATE: 16 BRPM | DIASTOLIC BLOOD PRESSURE: 78 MMHG | SYSTOLIC BLOOD PRESSURE: 149 MMHG

## 2019-08-19 VITALS
DIASTOLIC BLOOD PRESSURE: 82 MMHG | HEART RATE: 69 BPM | RESPIRATION RATE: 16 BRPM | OXYGEN SATURATION: 97 % | SYSTOLIC BLOOD PRESSURE: 128 MMHG

## 2019-08-19 VITALS
HEART RATE: 72 BPM | SYSTOLIC BLOOD PRESSURE: 149 MMHG | TEMPERATURE: 97.7 F | DIASTOLIC BLOOD PRESSURE: 78 MMHG | RESPIRATION RATE: 20 BRPM | OXYGEN SATURATION: 97 %

## 2019-08-19 VITALS
SYSTOLIC BLOOD PRESSURE: 149 MMHG | DIASTOLIC BLOOD PRESSURE: 65 MMHG | HEART RATE: 70 BPM | RESPIRATION RATE: 16 BRPM | OXYGEN SATURATION: 95 % | TEMPERATURE: 97.7 F

## 2019-08-19 VITALS — SYSTOLIC BLOOD PRESSURE: 149 MMHG | DIASTOLIC BLOOD PRESSURE: 78 MMHG

## 2019-08-19 VITALS — BODY MASS INDEX: 57.4 KG/M2 | BODY MASS INDEX: 57.6 KG/M2

## 2019-08-19 DIAGNOSIS — K21.9: ICD-10-CM

## 2019-08-19 DIAGNOSIS — F41.9: ICD-10-CM

## 2019-08-19 DIAGNOSIS — Z79.899: ICD-10-CM

## 2019-08-19 DIAGNOSIS — Z86.711: ICD-10-CM

## 2019-08-19 DIAGNOSIS — Z79.891: ICD-10-CM

## 2019-08-19 DIAGNOSIS — C67.9: ICD-10-CM

## 2019-08-19 DIAGNOSIS — F32.9: ICD-10-CM

## 2019-08-19 DIAGNOSIS — Z79.01: ICD-10-CM

## 2019-08-19 DIAGNOSIS — G47.30: ICD-10-CM

## 2019-08-19 DIAGNOSIS — I10: ICD-10-CM

## 2019-08-19 DIAGNOSIS — M51.37: ICD-10-CM

## 2019-08-19 DIAGNOSIS — M47.817: Primary | ICD-10-CM

## 2019-08-19 DIAGNOSIS — M46.96: ICD-10-CM

## 2019-08-19 DIAGNOSIS — Z87.891: ICD-10-CM

## 2019-08-19 DIAGNOSIS — I27.20: ICD-10-CM

## 2019-08-19 PROCEDURE — 82962 GLUCOSE BLOOD TEST: CPT

## 2019-08-19 PROCEDURE — 76000 FLUOROSCOPY <1 HR PHYS/QHP: CPT

## 2019-08-19 PROCEDURE — 64636 DESTROY L/S FACET JNT ADDL: CPT

## 2019-08-19 PROCEDURE — 64635 DESTROY LUMB/SAC FACET JNT: CPT

## 2019-08-19 PROCEDURE — 72110 X-RAY EXAM L-2 SPINE 4/>VWS: CPT

## 2019-10-07 ENCOUNTER — HOSPITAL ENCOUNTER (OUTPATIENT)
Dept: HOSPITAL 100 - SDC | Age: 49
Discharge: HOME | End: 2019-10-07
Payer: MEDICAID

## 2019-10-07 VITALS
RESPIRATION RATE: 16 BRPM | OXYGEN SATURATION: 94 % | DIASTOLIC BLOOD PRESSURE: 79 MMHG | HEART RATE: 74 BPM | SYSTOLIC BLOOD PRESSURE: 131 MMHG

## 2019-10-07 VITALS
OXYGEN SATURATION: 94 % | SYSTOLIC BLOOD PRESSURE: 146 MMHG | TEMPERATURE: 96.98 F | RESPIRATION RATE: 16 BRPM | HEART RATE: 81 BPM | DIASTOLIC BLOOD PRESSURE: 55 MMHG

## 2019-10-07 VITALS
TEMPERATURE: 96.98 F | HEART RATE: 69 BPM | RESPIRATION RATE: 16 BRPM | SYSTOLIC BLOOD PRESSURE: 124 MMHG | DIASTOLIC BLOOD PRESSURE: 79 MMHG

## 2019-10-07 VITALS
RESPIRATION RATE: 16 BRPM | HEART RATE: 73 BPM | DIASTOLIC BLOOD PRESSURE: 79 MMHG | OXYGEN SATURATION: 94 % | SYSTOLIC BLOOD PRESSURE: 132 MMHG

## 2019-10-07 VITALS — SYSTOLIC BLOOD PRESSURE: 146 MMHG | DIASTOLIC BLOOD PRESSURE: 79 MMHG

## 2019-10-07 VITALS
OXYGEN SATURATION: 90 % | DIASTOLIC BLOOD PRESSURE: 75 MMHG | RESPIRATION RATE: 16 BRPM | SYSTOLIC BLOOD PRESSURE: 146 MMHG | HEART RATE: 70 BPM

## 2019-10-07 VITALS
OXYGEN SATURATION: 98 % | TEMPERATURE: 98.42 F | HEART RATE: 70 BPM | RESPIRATION RATE: 18 BRPM | DIASTOLIC BLOOD PRESSURE: 79 MMHG | SYSTOLIC BLOOD PRESSURE: 146 MMHG

## 2019-10-07 VITALS — BODY MASS INDEX: 57.4 KG/M2 | BODY MASS INDEX: 58.4 KG/M2

## 2019-10-07 DIAGNOSIS — C67.2: ICD-10-CM

## 2019-10-07 DIAGNOSIS — E11.9: ICD-10-CM

## 2019-10-07 DIAGNOSIS — Z79.891: ICD-10-CM

## 2019-10-07 DIAGNOSIS — Z79.01: ICD-10-CM

## 2019-10-07 DIAGNOSIS — Z87.891: ICD-10-CM

## 2019-10-07 DIAGNOSIS — F32.9: ICD-10-CM

## 2019-10-07 DIAGNOSIS — K21.9: ICD-10-CM

## 2019-10-07 DIAGNOSIS — M19.90: ICD-10-CM

## 2019-10-07 DIAGNOSIS — M46.96: ICD-10-CM

## 2019-10-07 DIAGNOSIS — G47.30: ICD-10-CM

## 2019-10-07 DIAGNOSIS — E66.01: ICD-10-CM

## 2019-10-07 DIAGNOSIS — Z79.84: ICD-10-CM

## 2019-10-07 DIAGNOSIS — F41.9: ICD-10-CM

## 2019-10-07 DIAGNOSIS — M51.37: ICD-10-CM

## 2019-10-07 DIAGNOSIS — Z79.4: ICD-10-CM

## 2019-10-07 DIAGNOSIS — I10: ICD-10-CM

## 2019-10-07 DIAGNOSIS — Z79.899: ICD-10-CM

## 2019-10-07 DIAGNOSIS — Z86.711: ICD-10-CM

## 2019-10-07 DIAGNOSIS — M47.817: Primary | ICD-10-CM

## 2019-10-07 PROCEDURE — 76000 FLUOROSCOPY <1 HR PHYS/QHP: CPT

## 2019-10-07 PROCEDURE — 82962 GLUCOSE BLOOD TEST: CPT

## 2019-10-07 PROCEDURE — 64636 DESTROY L/S FACET JNT ADDL: CPT

## 2019-10-07 PROCEDURE — 72110 X-RAY EXAM L-2 SPINE 4/>VWS: CPT

## 2019-10-07 PROCEDURE — 64635 DESTROY LUMB/SAC FACET JNT: CPT

## 2019-10-22 ENCOUNTER — HOSPITAL ENCOUNTER (OUTPATIENT)
Age: 49
End: 2019-10-22
Payer: MEDICAID

## 2019-10-22 VITALS — BODY MASS INDEX: 57.4 KG/M2 | BODY MASS INDEX: 58.4 KG/M2

## 2019-10-22 DIAGNOSIS — G47.33: Primary | ICD-10-CM

## 2019-11-18 ENCOUNTER — HOSPITAL ENCOUNTER (OUTPATIENT)
Dept: HOSPITAL 100 - SDC | Age: 49
Discharge: HOME | End: 2019-11-18
Payer: MEDICAID

## 2019-11-18 VITALS
OXYGEN SATURATION: 94 % | HEART RATE: 73 BPM | RESPIRATION RATE: 18 BRPM | DIASTOLIC BLOOD PRESSURE: 85 MMHG | SYSTOLIC BLOOD PRESSURE: 148 MMHG

## 2019-11-18 VITALS
HEART RATE: 73 BPM | DIASTOLIC BLOOD PRESSURE: 96 MMHG | SYSTOLIC BLOOD PRESSURE: 148 MMHG | RESPIRATION RATE: 18 BRPM | OXYGEN SATURATION: 95 %

## 2019-11-18 VITALS
OXYGEN SATURATION: 96 % | RESPIRATION RATE: 16 BRPM | SYSTOLIC BLOOD PRESSURE: 137 MMHG | DIASTOLIC BLOOD PRESSURE: 86 MMHG | HEART RATE: 95 BPM

## 2019-11-18 VITALS
SYSTOLIC BLOOD PRESSURE: 79 MMHG | HEART RATE: 77 BPM | RESPIRATION RATE: 18 BRPM | DIASTOLIC BLOOD PRESSURE: 56 MMHG | TEMPERATURE: 98.2 F | OXYGEN SATURATION: 96 %

## 2019-11-18 VITALS
SYSTOLIC BLOOD PRESSURE: 148 MMHG | DIASTOLIC BLOOD PRESSURE: 85 MMHG | OXYGEN SATURATION: 98 % | HEART RATE: 71 BPM | TEMPERATURE: 97.6 F | RESPIRATION RATE: 16 BRPM

## 2019-11-18 VITALS
TEMPERATURE: 98.06 F | HEART RATE: 73 BPM | DIASTOLIC BLOOD PRESSURE: 85 MMHG | RESPIRATION RATE: 18 BRPM | SYSTOLIC BLOOD PRESSURE: 106 MMHG | OXYGEN SATURATION: 95 %

## 2019-11-18 VITALS — BODY MASS INDEX: 57.3 KG/M2 | BODY MASS INDEX: 57.6 KG/M2

## 2019-11-18 VITALS
RESPIRATION RATE: 18 BRPM | OXYGEN SATURATION: 96 % | HEART RATE: 75 BPM | SYSTOLIC BLOOD PRESSURE: 129 MMHG | DIASTOLIC BLOOD PRESSURE: 85 MMHG

## 2019-11-18 VITALS — SYSTOLIC BLOOD PRESSURE: 148 MMHG | DIASTOLIC BLOOD PRESSURE: 85 MMHG

## 2019-11-18 VITALS
HEART RATE: 79 BPM | RESPIRATION RATE: 18 BRPM | DIASTOLIC BLOOD PRESSURE: 85 MMHG | SYSTOLIC BLOOD PRESSURE: 122 MMHG | OXYGEN SATURATION: 95 %

## 2019-11-18 DIAGNOSIS — M48.07: ICD-10-CM

## 2019-11-18 DIAGNOSIS — M51.17: Primary | ICD-10-CM

## 2019-11-18 DIAGNOSIS — K21.9: ICD-10-CM

## 2019-11-18 DIAGNOSIS — Z86.711: ICD-10-CM

## 2019-11-18 DIAGNOSIS — I10: ICD-10-CM

## 2019-11-18 DIAGNOSIS — F17.210: ICD-10-CM

## 2019-11-18 DIAGNOSIS — Z79.01: ICD-10-CM

## 2019-11-18 DIAGNOSIS — Z79.891: ICD-10-CM

## 2019-11-18 DIAGNOSIS — F32.9: ICD-10-CM

## 2019-11-18 DIAGNOSIS — E11.9: ICD-10-CM

## 2019-11-18 DIAGNOSIS — Z79.899: ICD-10-CM

## 2019-11-18 DIAGNOSIS — Z79.84: ICD-10-CM

## 2019-11-18 DIAGNOSIS — Z79.4: ICD-10-CM

## 2019-11-18 DIAGNOSIS — F41.9: ICD-10-CM

## 2019-11-18 DIAGNOSIS — G47.30: ICD-10-CM

## 2019-11-18 PROCEDURE — 82962 GLUCOSE BLOOD TEST: CPT

## 2019-11-18 PROCEDURE — 01992 ANES DX/THER NRV BLK&INJ PRN: CPT

## 2019-11-18 PROCEDURE — 3E0S3BZ INTRODUCTION OF ANESTHETIC AGENT INTO EPIDURAL SPACE, PERCUTANEOUS APPROACH: ICD-10-PCS | Performed by: ANESTHESIOLOGY

## 2019-11-18 PROCEDURE — 62323 NJX INTERLAMINAR LMBR/SAC: CPT

## 2019-11-18 PROCEDURE — 77003 FLUOROGUIDE FOR SPINE INJECT: CPT

## 2019-11-18 PROCEDURE — 64483 NJX AA&/STRD TFRM EPI L/S 1: CPT

## 2019-11-18 RX ADMIN — SODIUM CHLORIDE 10 ML: 9 INJECTION, SOLUTION INTRAMUSCULAR; INTRAVENOUS; SUBCUTANEOUS at 10:53

## 2020-01-06 ENCOUNTER — HOSPITAL ENCOUNTER (EMERGENCY)
Age: 50
Discharge: HOME | End: 2020-01-06
Payer: MEDICARE

## 2020-01-06 VITALS — BODY MASS INDEX: 57.2 KG/M2 | BODY MASS INDEX: 57.6 KG/M2

## 2020-01-06 VITALS
SYSTOLIC BLOOD PRESSURE: 158 MMHG | DIASTOLIC BLOOD PRESSURE: 89 MMHG | HEART RATE: 79 BPM | RESPIRATION RATE: 18 BRPM | OXYGEN SATURATION: 98 % | TEMPERATURE: 98 F

## 2020-01-06 VITALS
OXYGEN SATURATION: 96 % | RESPIRATION RATE: 16 BRPM | SYSTOLIC BLOOD PRESSURE: 139 MMHG | HEART RATE: 71 BPM | DIASTOLIC BLOOD PRESSURE: 74 MMHG

## 2020-01-06 DIAGNOSIS — R51: ICD-10-CM

## 2020-01-06 DIAGNOSIS — D49.7: ICD-10-CM

## 2020-01-06 DIAGNOSIS — Z79.01: ICD-10-CM

## 2020-01-06 DIAGNOSIS — E66.9: ICD-10-CM

## 2020-01-06 DIAGNOSIS — K57.30: ICD-10-CM

## 2020-01-06 DIAGNOSIS — Z86.711: ICD-10-CM

## 2020-01-06 DIAGNOSIS — R10.32: Primary | ICD-10-CM

## 2020-01-06 DIAGNOSIS — Z86.718: ICD-10-CM

## 2020-01-06 DIAGNOSIS — Z79.899: ICD-10-CM

## 2020-01-06 DIAGNOSIS — R11.0: ICD-10-CM

## 2020-01-06 DIAGNOSIS — Z85.51: ICD-10-CM

## 2020-01-06 DIAGNOSIS — F17.200: ICD-10-CM

## 2020-01-06 LAB
ALANINE AMINOTRANSFER ALT/SGPT: 59 U/L (ref 16–61)
ALBUMIN SERPL-MCNC: 3.4 G/DL (ref 3.2–5)
ALKALINE PHOSPHATASE: 75 U/L (ref 45–117)
ANION GAP: 5 (ref 5–15)
AST(SGOT): 53 U/L (ref 15–37)
BUN SERPL-MCNC: 14 MG/DL (ref 7–18)
BUN/CREAT RATIO: 13.7 RATIO (ref 10–20)
CALCIUM SERPL-MCNC: 8.2 MG/DL (ref 8.5–10.1)
CARBON DIOXIDE: 24 MMOL/L (ref 21–32)
CHLORIDE: 106 MMOL/L (ref 98–107)
DEPRECATED RDW RBC: 48.8 FL (ref 35.1–43.9)
ERYTHROCYTE [DISTWIDTH] IN BLOOD: 14.6 % (ref 11.6–14.6)
EST GLOM FILT RATE - AFR AMER: 100 ML/MIN (ref 60–?)
ESTIMATED CREATININE CLEARANCE: 93.3 ML/MIN
GLOBULIN: 5 G/DL (ref 2.2–4.2)
GLUCOSE: 78 MG/DL (ref 74–106)
HCT VFR BLD AUTO: 42.8 % (ref 40–54)
HEMOGLOBIN: 14.1 G/DL (ref 13–16.5)
HGB BLD-MCNC: 14.1 G/DL (ref 13–16.5)
IMMATURE GRANULOCYTES COUNT: 0.03 X10^3/UL (ref 0–0)
KETONE-DIPSTICK: 5 MG/DL
MCV RBC: 92 FL (ref 80–94)
MEAN CORP HGB CONC: 32.9 G/DL (ref 32–36)
MEAN PLATELET VOL.: 9.6 FL (ref 6.2–12)
MUCOUS THREADS URNS QL MICRO: (no result) /HPF
NRBC FLAGGED BY ANALYZER: 0 % (ref 0–5)
PLATELET # BLD: 248 K/MM3 (ref 150–450)
PLATELET COUNT: 248 K/MM3 (ref 150–450)
POTASSIUM: 3.7 MMOL/L (ref 3.5–5.1)
RBC # BLD AUTO: 4.65 M/MM3 (ref 4.6–6.2)
RBC DISTRIBUTION WIDTH CV: 14.6 % (ref 11.6–14.6)
RBC DISTRIBUTION WIDTH SD: 48.8 FL (ref 35.1–43.9)
RBC UR QL: (no result) /HPF (ref 0–5)
SP GR UR: 1.01 (ref 1–1.03)
SQUAMOUS URNS QL MICRO: (no result) /HPF (ref 0–5)
URINE PRESERVATIVE: (no result)
WBC # BLD AUTO: 9.7 K/MM3 (ref 4.4–11)
WHITE BLOOD COUNT: 9.7 K/MM3 (ref 4.4–11)

## 2020-01-06 PROCEDURE — 96361 HYDRATE IV INFUSION ADD-ON: CPT

## 2020-01-06 PROCEDURE — A4216 STERILE WATER/SALINE, 10 ML: HCPCS

## 2020-01-06 PROCEDURE — 96375 TX/PRO/DX INJ NEW DRUG ADDON: CPT

## 2020-01-06 PROCEDURE — 80053 COMPREHEN METABOLIC PANEL: CPT

## 2020-01-06 PROCEDURE — 96374 THER/PROPH/DIAG INJ IV PUSH: CPT

## 2020-01-06 PROCEDURE — 85025 COMPLETE CBC W/AUTO DIFF WBC: CPT

## 2020-01-06 PROCEDURE — 81001 URINALYSIS AUTO W/SCOPE: CPT

## 2020-01-06 PROCEDURE — 99282 EMERGENCY DEPT VISIT SF MDM: CPT

## 2020-01-06 PROCEDURE — 74177 CT ABD & PELVIS W/CONTRAST: CPT

## 2020-01-20 ENCOUNTER — HOSPITAL ENCOUNTER (OUTPATIENT)
Age: 50
End: 2020-01-20
Payer: MEDICARE

## 2020-01-20 VITALS — BODY MASS INDEX: 55.4 KG/M2

## 2020-01-20 DIAGNOSIS — E11.9: Primary | ICD-10-CM

## 2020-01-20 LAB
ALANINE AMINOTRANSFER ALT/SGPT: 50 U/L (ref 16–61)
ALBUMIN SERPL-MCNC: 3.4 G/DL (ref 3.2–5)
ALKALINE PHOSPHATASE: 73 U/L (ref 45–117)
ANION GAP: 5 (ref 5–15)
AST(SGOT): 51 U/L (ref 15–37)
BUN SERPL-MCNC: 12 MG/DL (ref 7–18)
BUN/CREAT RATIO: 12.1 RATIO (ref 10–20)
CALCIUM SERPL-MCNC: 8.7 MG/DL (ref 8.5–10.1)
CARBON DIOXIDE: 25 MMOL/L (ref 21–32)
CHLORIDE: 107 MMOL/L (ref 98–107)
CREAT UR-MCNC: 92 MG/DL
EST GLOM FILT RATE - AFR AMER: 103 ML/MIN (ref 60–?)
GLOBULIN: 5.3 G/DL (ref 2.2–4.2)
GLUCOSE: 81 MG/DL (ref 74–106)
MICROALBUMIN UR-MCNC: 9.9 MG/L
POTASSIUM: 3.6 MMOL/L (ref 3.5–5.1)

## 2020-01-20 PROCEDURE — 82043 UR ALBUMIN QUANTITATIVE: CPT

## 2020-01-20 PROCEDURE — 80053 COMPREHEN METABOLIC PANEL: CPT

## 2020-01-20 PROCEDURE — 82570 ASSAY OF URINE CREATININE: CPT

## 2020-01-20 PROCEDURE — 36415 COLL VENOUS BLD VENIPUNCTURE: CPT

## 2020-03-16 ENCOUNTER — HOSPITAL ENCOUNTER (OUTPATIENT)
Dept: HOSPITAL 100 - SDC | Age: 50
Discharge: HOME | End: 2020-03-16
Payer: MEDICARE

## 2020-03-16 VITALS
HEART RATE: 72 BPM | OXYGEN SATURATION: 93 % | DIASTOLIC BLOOD PRESSURE: 76 MMHG | RESPIRATION RATE: 18 BRPM | SYSTOLIC BLOOD PRESSURE: 143 MMHG

## 2020-03-16 VITALS
RESPIRATION RATE: 16 BRPM | DIASTOLIC BLOOD PRESSURE: 91 MMHG | TEMPERATURE: 97.6 F | HEART RATE: 68 BPM | OXYGEN SATURATION: 94 % | SYSTOLIC BLOOD PRESSURE: 143 MMHG

## 2020-03-16 VITALS
SYSTOLIC BLOOD PRESSURE: 126 MMHG | HEART RATE: 76 BPM | TEMPERATURE: 97.8 F | OXYGEN SATURATION: 94 % | DIASTOLIC BLOOD PRESSURE: 75 MMHG | RESPIRATION RATE: 14 BRPM

## 2020-03-16 VITALS
HEART RATE: 70 BPM | OXYGEN SATURATION: 95 % | DIASTOLIC BLOOD PRESSURE: 80 MMHG | RESPIRATION RATE: 18 BRPM | SYSTOLIC BLOOD PRESSURE: 133 MMHG

## 2020-03-16 VITALS
DIASTOLIC BLOOD PRESSURE: 81 MMHG | OXYGEN SATURATION: 93 % | RESPIRATION RATE: 18 BRPM | HEART RATE: 68 BPM | TEMPERATURE: 97.7 F | SYSTOLIC BLOOD PRESSURE: 143 MMHG

## 2020-03-16 VITALS — DIASTOLIC BLOOD PRESSURE: 91 MMHG | SYSTOLIC BLOOD PRESSURE: 143 MMHG

## 2020-03-16 VITALS — BODY MASS INDEX: 52 KG/M2 | BODY MASS INDEX: 52.9 KG/M2

## 2020-03-16 DIAGNOSIS — F32.9: ICD-10-CM

## 2020-03-16 DIAGNOSIS — K21.9: ICD-10-CM

## 2020-03-16 DIAGNOSIS — Z79.891: ICD-10-CM

## 2020-03-16 DIAGNOSIS — Z85.51: ICD-10-CM

## 2020-03-16 DIAGNOSIS — I10: ICD-10-CM

## 2020-03-16 DIAGNOSIS — Z86.718: ICD-10-CM

## 2020-03-16 DIAGNOSIS — Z79.899: ICD-10-CM

## 2020-03-16 DIAGNOSIS — Z79.1: ICD-10-CM

## 2020-03-16 DIAGNOSIS — M47.817: ICD-10-CM

## 2020-03-16 DIAGNOSIS — Z79.4: ICD-10-CM

## 2020-03-16 DIAGNOSIS — M47.816: Primary | ICD-10-CM

## 2020-03-16 DIAGNOSIS — M51.37: ICD-10-CM

## 2020-03-16 DIAGNOSIS — F41.9: ICD-10-CM

## 2020-03-16 DIAGNOSIS — F17.210: ICD-10-CM

## 2020-03-16 PROCEDURE — 76000 FLUOROSCOPY <1 HR PHYS/QHP: CPT

## 2020-03-16 PROCEDURE — 82962 GLUCOSE BLOOD TEST: CPT

## 2020-03-16 PROCEDURE — 72100 X-RAY EXAM L-S SPINE 2/3 VWS: CPT

## 2020-03-16 PROCEDURE — 64635 DESTROY LUMB/SAC FACET JNT: CPT

## 2020-03-16 PROCEDURE — 64636 DESTROY L/S FACET JNT ADDL: CPT

## 2020-03-16 PROCEDURE — 01936: CPT

## 2020-03-20 ENCOUNTER — HOSPITAL ENCOUNTER (OUTPATIENT)
Age: 50
End: 2020-03-20
Payer: MEDICARE

## 2020-03-20 VITALS — BODY MASS INDEX: 52 KG/M2 | BODY MASS INDEX: 52.9 KG/M2

## 2020-03-20 DIAGNOSIS — R91.1: Primary | ICD-10-CM

## 2020-03-20 PROCEDURE — 71250 CT THORAX DX C-: CPT

## 2020-06-01 ENCOUNTER — HOSPITAL ENCOUNTER (OUTPATIENT)
Dept: HOSPITAL 100 - SDC | Age: 50
Discharge: HOME | End: 2020-06-01
Payer: MEDICARE

## 2020-06-01 VITALS — DIASTOLIC BLOOD PRESSURE: 64 MMHG | SYSTOLIC BLOOD PRESSURE: 120 MMHG

## 2020-06-01 VITALS
OXYGEN SATURATION: 94 % | RESPIRATION RATE: 18 BRPM | HEART RATE: 73 BPM | DIASTOLIC BLOOD PRESSURE: 64 MMHG | SYSTOLIC BLOOD PRESSURE: 120 MMHG

## 2020-06-01 VITALS
DIASTOLIC BLOOD PRESSURE: 78 MMHG | RESPIRATION RATE: 18 BRPM | OXYGEN SATURATION: 94 % | SYSTOLIC BLOOD PRESSURE: 116 MMHG | TEMPERATURE: 97.7 F | HEART RATE: 64 BPM

## 2020-06-01 VITALS
OXYGEN SATURATION: 94 % | TEMPERATURE: 98.24 F | HEART RATE: 73 BPM | DIASTOLIC BLOOD PRESSURE: 67 MMHG | RESPIRATION RATE: 20 BRPM | SYSTOLIC BLOOD PRESSURE: 113 MMHG

## 2020-06-01 VITALS
SYSTOLIC BLOOD PRESSURE: 120 MMHG | OXYGEN SATURATION: 96 % | HEART RATE: 67 BPM | DIASTOLIC BLOOD PRESSURE: 64 MMHG | RESPIRATION RATE: 18 BRPM

## 2020-06-01 VITALS
HEART RATE: 72 BPM | SYSTOLIC BLOOD PRESSURE: 120 MMHG | OXYGEN SATURATION: 95 % | RESPIRATION RATE: 18 BRPM | DIASTOLIC BLOOD PRESSURE: 64 MMHG

## 2020-06-01 VITALS
OXYGEN SATURATION: 95 % | DIASTOLIC BLOOD PRESSURE: 64 MMHG | RESPIRATION RATE: 18 BRPM | HEART RATE: 70 BPM | SYSTOLIC BLOOD PRESSURE: 120 MMHG

## 2020-06-01 VITALS — BODY MASS INDEX: 52 KG/M2 | BODY MASS INDEX: 52.1 KG/M2

## 2020-06-01 VITALS
SYSTOLIC BLOOD PRESSURE: 120 MMHG | DIASTOLIC BLOOD PRESSURE: 64 MMHG | RESPIRATION RATE: 18 BRPM | OXYGEN SATURATION: 95 % | TEMPERATURE: 98.2 F | HEART RATE: 66 BPM

## 2020-06-01 DIAGNOSIS — Z86.711: ICD-10-CM

## 2020-06-01 DIAGNOSIS — F41.9: ICD-10-CM

## 2020-06-01 DIAGNOSIS — M51.37: Primary | ICD-10-CM

## 2020-06-01 DIAGNOSIS — G47.30: ICD-10-CM

## 2020-06-01 DIAGNOSIS — Z79.891: ICD-10-CM

## 2020-06-01 DIAGNOSIS — I10: ICD-10-CM

## 2020-06-01 DIAGNOSIS — F32.9: ICD-10-CM

## 2020-06-01 DIAGNOSIS — M47.817: ICD-10-CM

## 2020-06-01 DIAGNOSIS — Z79.01: ICD-10-CM

## 2020-06-01 DIAGNOSIS — Z79.84: ICD-10-CM

## 2020-06-01 DIAGNOSIS — E11.9: ICD-10-CM

## 2020-06-01 DIAGNOSIS — F17.210: ICD-10-CM

## 2020-06-01 DIAGNOSIS — Z79.899: ICD-10-CM

## 2020-06-01 DIAGNOSIS — K21.9: ICD-10-CM

## 2020-06-01 LAB
CREAT UR-MCNC: 268 MG/DL
MICROALBUMIN UR-MCNC: 24.4 MG/L

## 2020-06-01 PROCEDURE — 76000 FLUOROSCOPY <1 HR PHYS/QHP: CPT

## 2020-06-01 PROCEDURE — 86225 DNA ANTIBODY NATIVE: CPT

## 2020-06-01 PROCEDURE — 36415 COLL VENOUS BLD VENIPUNCTURE: CPT

## 2020-06-01 PROCEDURE — 64636 DESTROY L/S FACET JNT ADDL: CPT

## 2020-06-01 PROCEDURE — 72110 X-RAY EXAM L-2 SPINE 4/>VWS: CPT

## 2020-06-01 PROCEDURE — 86431 RHEUMATOID FACTOR QUANT: CPT

## 2020-06-01 PROCEDURE — 86160 COMPLEMENT ANTIGEN: CPT

## 2020-06-01 PROCEDURE — 83036 HEMOGLOBIN GLYCOSYLATED A1C: CPT

## 2020-06-01 PROCEDURE — 64635 DESTROY LUMB/SAC FACET JNT: CPT

## 2020-06-01 PROCEDURE — 82043 UR ALBUMIN QUANTITATIVE: CPT

## 2020-06-01 PROCEDURE — 86200 CCP ANTIBODY: CPT

## 2020-06-01 PROCEDURE — 86038 ANTINUCLEAR ANTIBODIES: CPT

## 2020-06-01 PROCEDURE — 86235 NUCLEAR ANTIGEN ANTIBODY: CPT

## 2020-06-01 PROCEDURE — 82570 ASSAY OF URINE CREATININE: CPT

## 2020-06-02 LAB
ANTI-JO: <0.2 AI (ref 0–0.9)
ENA JO1 AB SER-ACNC: <0.2 AI (ref 0–0.9)
SJOGREN'S ANTI-SS-A TEST: < 0.2 AI (ref 0–0.9)
SJOGREN'S ANTI-SS-B TEST: < 0.2 AI (ref 0–0.9)

## 2020-06-03 LAB
ANTI-DSDNA  AB: 1 IU/ML (ref 0–9)
DSDNA AB SER-ACNC: 1 IU/ML (ref 0–9)

## 2020-07-06 ENCOUNTER — HOSPITAL ENCOUNTER (OUTPATIENT)
Dept: HOSPITAL 100 - SDC | Age: 50
Discharge: HOME | End: 2020-07-06
Payer: MEDICARE

## 2020-07-06 VITALS
OXYGEN SATURATION: 95 % | HEART RATE: 73 BPM | TEMPERATURE: 97.34 F | DIASTOLIC BLOOD PRESSURE: 87 MMHG | SYSTOLIC BLOOD PRESSURE: 140 MMHG | RESPIRATION RATE: 16 BRPM

## 2020-07-06 VITALS — DIASTOLIC BLOOD PRESSURE: 87 MMHG | SYSTOLIC BLOOD PRESSURE: 140 MMHG

## 2020-07-06 VITALS
SYSTOLIC BLOOD PRESSURE: 122 MMHG | DIASTOLIC BLOOD PRESSURE: 63 MMHG | HEART RATE: 71 BPM | RESPIRATION RATE: 16 BRPM | OXYGEN SATURATION: 97 %

## 2020-07-06 VITALS
OXYGEN SATURATION: 99 % | HEART RATE: 69 BPM | RESPIRATION RATE: 16 BRPM | SYSTOLIC BLOOD PRESSURE: 104 MMHG | DIASTOLIC BLOOD PRESSURE: 87 MMHG | TEMPERATURE: 97.88 F

## 2020-07-06 VITALS
RESPIRATION RATE: 16 BRPM | OXYGEN SATURATION: 98 % | DIASTOLIC BLOOD PRESSURE: 87 MMHG | HEART RATE: 69 BPM | SYSTOLIC BLOOD PRESSURE: 122 MMHG

## 2020-07-06 VITALS
HEART RATE: 68 BPM | OXYGEN SATURATION: 97 % | SYSTOLIC BLOOD PRESSURE: 131 MMHG | DIASTOLIC BLOOD PRESSURE: 72 MMHG | RESPIRATION RATE: 16 BRPM

## 2020-07-06 VITALS
RESPIRATION RATE: 16 BRPM | SYSTOLIC BLOOD PRESSURE: 117 MMHG | OXYGEN SATURATION: 98 % | DIASTOLIC BLOOD PRESSURE: 66 MMHG | TEMPERATURE: 98.78 F | HEART RATE: 74 BPM

## 2020-07-06 VITALS — BODY MASS INDEX: 52.5 KG/M2 | BODY MASS INDEX: 52.1 KG/M2

## 2020-07-06 VITALS
RESPIRATION RATE: 16 BRPM | SYSTOLIC BLOOD PRESSURE: 124 MMHG | OXYGEN SATURATION: 97 % | HEART RATE: 70 BPM | DIASTOLIC BLOOD PRESSURE: 79 MMHG

## 2020-07-06 DIAGNOSIS — C67.9: ICD-10-CM

## 2020-07-06 DIAGNOSIS — I10: ICD-10-CM

## 2020-07-06 DIAGNOSIS — M51.17: ICD-10-CM

## 2020-07-06 DIAGNOSIS — Z79.899: ICD-10-CM

## 2020-07-06 DIAGNOSIS — M48.07: Primary | ICD-10-CM

## 2020-07-06 DIAGNOSIS — K44.9: ICD-10-CM

## 2020-07-06 DIAGNOSIS — Z79.891: ICD-10-CM

## 2020-07-06 DIAGNOSIS — F41.9: ICD-10-CM

## 2020-07-06 DIAGNOSIS — Z79.01: ICD-10-CM

## 2020-07-06 DIAGNOSIS — K21.9: ICD-10-CM

## 2020-07-06 DIAGNOSIS — F32.9: ICD-10-CM

## 2020-07-06 DIAGNOSIS — Z79.84: ICD-10-CM

## 2020-07-06 DIAGNOSIS — M51.36: ICD-10-CM

## 2020-07-06 DIAGNOSIS — E11.9: ICD-10-CM

## 2020-07-06 DIAGNOSIS — F17.210: ICD-10-CM

## 2020-07-06 PROCEDURE — 3E0S3BZ INTRODUCTION OF ANESTHETIC AGENT INTO EPIDURAL SPACE, PERCUTANEOUS APPROACH: ICD-10-PCS | Performed by: ANESTHESIOLOGY

## 2020-07-06 PROCEDURE — 01922 ANES N-INVAS IMG/RADJ THER: CPT

## 2020-07-06 PROCEDURE — 72100 X-RAY EXAM L-S SPINE 2/3 VWS: CPT

## 2020-07-06 PROCEDURE — 62323 NJX INTERLAMINAR LMBR/SAC: CPT

## 2020-07-06 PROCEDURE — 64483 NJX AA&/STRD TFRM EPI L/S 1: CPT

## 2020-07-06 PROCEDURE — 82962 GLUCOSE BLOOD TEST: CPT

## 2020-07-06 RX ADMIN — SODIUM CHLORIDE 10 ML: 9 INJECTION, SOLUTION INTRAMUSCULAR; INTRAVENOUS; SUBCUTANEOUS at 11:29

## 2020-10-05 ENCOUNTER — HOSPITAL ENCOUNTER (OUTPATIENT)
Dept: HOSPITAL 100 - SDC | Age: 50
Discharge: HOME | End: 2020-10-05
Payer: MEDICARE

## 2020-10-05 VITALS
RESPIRATION RATE: 16 BRPM | DIASTOLIC BLOOD PRESSURE: 80 MMHG | OXYGEN SATURATION: 96 % | TEMPERATURE: 97.7 F | HEART RATE: 70 BPM | SYSTOLIC BLOOD PRESSURE: 132 MMHG

## 2020-10-05 VITALS
HEART RATE: 71 BPM | DIASTOLIC BLOOD PRESSURE: 80 MMHG | RESPIRATION RATE: 18 BRPM | SYSTOLIC BLOOD PRESSURE: 132 MMHG | OXYGEN SATURATION: 94 %

## 2020-10-05 VITALS
OXYGEN SATURATION: 94 % | HEART RATE: 80 BPM | DIASTOLIC BLOOD PRESSURE: 80 MMHG | RESPIRATION RATE: 20 BRPM | SYSTOLIC BLOOD PRESSURE: 132 MMHG

## 2020-10-05 VITALS
OXYGEN SATURATION: 95 % | SYSTOLIC BLOOD PRESSURE: 125 MMHG | RESPIRATION RATE: 18 BRPM | DIASTOLIC BLOOD PRESSURE: 80 MMHG | TEMPERATURE: 97.88 F | HEART RATE: 76 BPM

## 2020-10-05 VITALS
DIASTOLIC BLOOD PRESSURE: 80 MMHG | RESPIRATION RATE: 18 BRPM | SYSTOLIC BLOOD PRESSURE: 132 MMHG | HEART RATE: 69 BPM | OXYGEN SATURATION: 96 %

## 2020-10-05 VITALS
SYSTOLIC BLOOD PRESSURE: 125 MMHG | DIASTOLIC BLOOD PRESSURE: 80 MMHG | RESPIRATION RATE: 20 BRPM | OXYGEN SATURATION: 94 % | HEART RATE: 72 BPM

## 2020-10-05 VITALS — BODY MASS INDEX: 55 KG/M2 | BODY MASS INDEX: 52.9 KG/M2

## 2020-10-05 VITALS
RESPIRATION RATE: 18 BRPM | DIASTOLIC BLOOD PRESSURE: 87 MMHG | SYSTOLIC BLOOD PRESSURE: 132 MMHG | HEART RATE: 69 BPM | OXYGEN SATURATION: 95 %

## 2020-10-05 VITALS — SYSTOLIC BLOOD PRESSURE: 132 MMHG | DIASTOLIC BLOOD PRESSURE: 80 MMHG

## 2020-10-05 DIAGNOSIS — Z79.891: ICD-10-CM

## 2020-10-05 DIAGNOSIS — Z96.641: ICD-10-CM

## 2020-10-05 DIAGNOSIS — M51.37: ICD-10-CM

## 2020-10-05 DIAGNOSIS — I10: ICD-10-CM

## 2020-10-05 DIAGNOSIS — Z86.711: ICD-10-CM

## 2020-10-05 DIAGNOSIS — E11.9: ICD-10-CM

## 2020-10-05 DIAGNOSIS — Z79.01: ICD-10-CM

## 2020-10-05 DIAGNOSIS — F32.9: ICD-10-CM

## 2020-10-05 DIAGNOSIS — M47.817: Primary | ICD-10-CM

## 2020-10-05 DIAGNOSIS — Z79.899: ICD-10-CM

## 2020-10-05 DIAGNOSIS — F17.210: ICD-10-CM

## 2020-10-05 DIAGNOSIS — Z79.84: ICD-10-CM

## 2020-10-05 DIAGNOSIS — Z79.51: ICD-10-CM

## 2020-10-05 DIAGNOSIS — F41.9: ICD-10-CM

## 2020-10-05 PROCEDURE — 64635 DESTROY LUMB/SAC FACET JNT: CPT

## 2020-10-05 PROCEDURE — 64636 DESTROY L/S FACET JNT ADDL: CPT

## 2020-10-05 PROCEDURE — 82962 GLUCOSE BLOOD TEST: CPT

## 2020-10-05 PROCEDURE — 76000 FLUOROSCOPY <1 HR PHYS/QHP: CPT

## 2020-10-05 PROCEDURE — 72110 X-RAY EXAM L-2 SPINE 4/>VWS: CPT

## 2021-05-11 ENCOUNTER — HOSPITAL ENCOUNTER (OUTPATIENT)
Age: 51
End: 2021-05-11
Payer: MEDICARE

## 2021-05-11 VITALS — BODY MASS INDEX: 56.1 KG/M2

## 2021-05-11 DIAGNOSIS — R91.1: Primary | ICD-10-CM

## 2021-05-11 PROCEDURE — 71250 CT THORAX DX C-: CPT

## 2021-06-21 ENCOUNTER — HOSPITAL ENCOUNTER (OUTPATIENT)
Dept: HOSPITAL 100 - SDC | Age: 51
Discharge: HOME | End: 2021-06-21
Payer: MEDICARE

## 2021-06-21 VITALS
RESPIRATION RATE: 16 BRPM | HEART RATE: 81 BPM | DIASTOLIC BLOOD PRESSURE: 96 MMHG | OXYGEN SATURATION: 97 % | TEMPERATURE: 97.7 F | SYSTOLIC BLOOD PRESSURE: 160 MMHG

## 2021-06-21 VITALS
TEMPERATURE: 98.06 F | RESPIRATION RATE: 20 BRPM | HEART RATE: 81 BPM | OXYGEN SATURATION: 94 % | SYSTOLIC BLOOD PRESSURE: 123 MMHG | DIASTOLIC BLOOD PRESSURE: 96 MMHG

## 2021-06-21 VITALS
OXYGEN SATURATION: 96 % | DIASTOLIC BLOOD PRESSURE: 93 MMHG | SYSTOLIC BLOOD PRESSURE: 160 MMHG | RESPIRATION RATE: 20 BRPM | HEART RATE: 89 BPM | TEMPERATURE: 98.3 F

## 2021-06-21 VITALS
OXYGEN SATURATION: 96 % | SYSTOLIC BLOOD PRESSURE: 127 MMHG | HEART RATE: 87 BPM | RESPIRATION RATE: 20 BRPM | DIASTOLIC BLOOD PRESSURE: 73 MMHG

## 2021-06-21 VITALS
OXYGEN SATURATION: 95 % | DIASTOLIC BLOOD PRESSURE: 96 MMHG | SYSTOLIC BLOOD PRESSURE: 160 MMHG | HEART RATE: 85 BPM | RESPIRATION RATE: 20 BRPM

## 2021-06-21 VITALS
HEART RATE: 86 BPM | SYSTOLIC BLOOD PRESSURE: 121 MMHG | OXYGEN SATURATION: 96 % | RESPIRATION RATE: 18 BRPM | DIASTOLIC BLOOD PRESSURE: 96 MMHG

## 2021-06-21 VITALS — DIASTOLIC BLOOD PRESSURE: 96 MMHG | SYSTOLIC BLOOD PRESSURE: 160 MMHG

## 2021-06-21 VITALS — BODY MASS INDEX: 56.1 KG/M2 | BODY MASS INDEX: 55.5 KG/M2

## 2021-06-21 DIAGNOSIS — E11.9: ICD-10-CM

## 2021-06-21 DIAGNOSIS — K21.9: ICD-10-CM

## 2021-06-21 DIAGNOSIS — M47.816: ICD-10-CM

## 2021-06-21 DIAGNOSIS — Z86.718: ICD-10-CM

## 2021-06-21 DIAGNOSIS — F41.9: ICD-10-CM

## 2021-06-21 DIAGNOSIS — G47.33: ICD-10-CM

## 2021-06-21 DIAGNOSIS — Z79.891: ICD-10-CM

## 2021-06-21 DIAGNOSIS — J45.909: ICD-10-CM

## 2021-06-21 DIAGNOSIS — Z87.891: ICD-10-CM

## 2021-06-21 DIAGNOSIS — M51.17: Primary | ICD-10-CM

## 2021-06-21 DIAGNOSIS — M48.07: ICD-10-CM

## 2021-06-21 DIAGNOSIS — Z79.899: ICD-10-CM

## 2021-06-21 DIAGNOSIS — E66.01: ICD-10-CM

## 2021-06-21 DIAGNOSIS — F32.9: ICD-10-CM

## 2021-06-21 DIAGNOSIS — Z79.84: ICD-10-CM

## 2021-06-21 PROCEDURE — 01992 ANES DX/THER NRV BLK&INJ PRN: CPT

## 2021-06-21 PROCEDURE — 64483 NJX AA&/STRD TFRM EPI L/S 1: CPT

## 2021-06-21 PROCEDURE — 82962 GLUCOSE BLOOD TEST: CPT

## 2021-06-21 PROCEDURE — 3E0S3BZ INTRODUCTION OF ANESTHETIC AGENT INTO EPIDURAL SPACE, PERCUTANEOUS APPROACH: ICD-10-PCS | Performed by: ANESTHESIOLOGY

## 2021-06-21 PROCEDURE — 77003 FLUOROGUIDE FOR SPINE INJECT: CPT

## 2021-06-21 PROCEDURE — 62323 NJX INTERLAMINAR LMBR/SAC: CPT

## 2021-06-21 RX ADMIN — LIDOCAINE HYDROCHLORIDE 5 ML: 10 SOLUTION INTRAVENOUS at 10:10

## 2021-06-21 RX ADMIN — SODIUM CHLORIDE 10 ML: 9 INJECTION, SOLUTION INTRAMUSCULAR; INTRAVENOUS; SUBCUTANEOUS at 10:10

## 2021-06-29 ENCOUNTER — HOSPITAL ENCOUNTER (OUTPATIENT)
Age: 51
End: 2021-06-29
Payer: MEDICARE

## 2021-06-29 VITALS — BODY MASS INDEX: 55.5 KG/M2

## 2021-06-29 DIAGNOSIS — I10: ICD-10-CM

## 2021-06-29 DIAGNOSIS — E11.9: Primary | ICD-10-CM

## 2021-06-29 LAB
ALANINE AMINOTRANSFER ALT/SGPT: 48 U/L (ref 16–61)
ALBUMIN SERPL-MCNC: 3.4 G/DL (ref 3.2–5)
ALKALINE PHOSPHATASE: 86 U/L (ref 45–117)
ANION GAP: 7 (ref 5–15)
AST(SGOT): 51 U/L (ref 15–37)
BUN SERPL-MCNC: 17 MG/DL (ref 7–18)
BUN/CREAT RATIO: 15.5 RATIO (ref 10–20)
CALCIUM SERPL-MCNC: 8.5 MG/DL (ref 8.5–10.1)
CARBON DIOXIDE: 24 MMOL/L (ref 21–32)
CHLORIDE: 105 MMOL/L (ref 98–107)
CHOLEST SERPL-MCNC: 175 MG/DL
CREAT UR-MCNC: 67.9 MG/DL
EST GLOM FILT RATE - AFR AMER: 91 ML/MIN (ref 60–?)
GLOBULIN: 4.9 G/DL (ref 2.2–4.2)
GLUCOSE: 139 MG/DL (ref 74–106)
MICROALBUMIN UR-MCNC: 5.5 MG/L
POTASSIUM: 4 MMOL/L (ref 3.5–5.1)
TRIGLYCERIDES: 304 MG/DL
VLDLC SERPL-MCNC: 61 MG/DL (ref 5–40)

## 2021-06-29 PROCEDURE — 82570 ASSAY OF URINE CREATININE: CPT

## 2021-06-29 PROCEDURE — 36415 COLL VENOUS BLD VENIPUNCTURE: CPT

## 2021-06-29 PROCEDURE — 80061 LIPID PANEL: CPT

## 2021-06-29 PROCEDURE — 82043 UR ALBUMIN QUANTITATIVE: CPT

## 2021-06-29 PROCEDURE — 80053 COMPREHEN METABOLIC PANEL: CPT

## 2021-06-29 PROCEDURE — 84443 ASSAY THYROID STIM HORMONE: CPT

## 2021-08-02 ENCOUNTER — HOSPITAL ENCOUNTER (OUTPATIENT)
Dept: HOSPITAL 100 - SDC | Age: 51
Discharge: HOME | End: 2021-08-02
Payer: MEDICARE

## 2021-08-02 VITALS
SYSTOLIC BLOOD PRESSURE: 109 MMHG | HEART RATE: 75 BPM | RESPIRATION RATE: 16 BRPM | DIASTOLIC BLOOD PRESSURE: 93 MMHG | OXYGEN SATURATION: 93 %

## 2021-08-02 VITALS
SYSTOLIC BLOOD PRESSURE: 113 MMHG | HEART RATE: 76 BPM | TEMPERATURE: 97.6 F | RESPIRATION RATE: 16 BRPM | DIASTOLIC BLOOD PRESSURE: 80 MMHG | OXYGEN SATURATION: 95 %

## 2021-08-02 VITALS
TEMPERATURE: 97.3 F | RESPIRATION RATE: 16 BRPM | HEART RATE: 74 BPM | OXYGEN SATURATION: 95 % | DIASTOLIC BLOOD PRESSURE: 93 MMHG | SYSTOLIC BLOOD PRESSURE: 127 MMHG

## 2021-08-02 VITALS
RESPIRATION RATE: 16 BRPM | SYSTOLIC BLOOD PRESSURE: 127 MMHG | HEART RATE: 80 BPM | DIASTOLIC BLOOD PRESSURE: 65 MMHG | OXYGEN SATURATION: 93 %

## 2021-08-02 VITALS — BODY MASS INDEX: 56.2 KG/M2 | BODY MASS INDEX: 56.7 KG/M2

## 2021-08-02 VITALS
SYSTOLIC BLOOD PRESSURE: 127 MMHG | RESPIRATION RATE: 16 BRPM | DIASTOLIC BLOOD PRESSURE: 93 MMHG | TEMPERATURE: 97.34 F | HEART RATE: 80 BPM | OXYGEN SATURATION: 99 %

## 2021-08-02 VITALS
DIASTOLIC BLOOD PRESSURE: 93 MMHG | HEART RATE: 80 BPM | OXYGEN SATURATION: 93 % | SYSTOLIC BLOOD PRESSURE: 127 MMHG | RESPIRATION RATE: 16 BRPM

## 2021-08-02 VITALS — SYSTOLIC BLOOD PRESSURE: 127 MMHG | DIASTOLIC BLOOD PRESSURE: 93 MMHG

## 2021-08-02 VITALS
RESPIRATION RATE: 16 BRPM | HEART RATE: 77 BPM | OXYGEN SATURATION: 94 % | SYSTOLIC BLOOD PRESSURE: 108 MMHG | DIASTOLIC BLOOD PRESSURE: 93 MMHG

## 2021-08-02 DIAGNOSIS — E66.01: ICD-10-CM

## 2021-08-02 DIAGNOSIS — K21.9: ICD-10-CM

## 2021-08-02 DIAGNOSIS — M47.26: ICD-10-CM

## 2021-08-02 DIAGNOSIS — K76.0: ICD-10-CM

## 2021-08-02 DIAGNOSIS — M51.37: ICD-10-CM

## 2021-08-02 DIAGNOSIS — Z79.4: ICD-10-CM

## 2021-08-02 DIAGNOSIS — I10: ICD-10-CM

## 2021-08-02 DIAGNOSIS — F32.9: ICD-10-CM

## 2021-08-02 DIAGNOSIS — G47.33: ICD-10-CM

## 2021-08-02 DIAGNOSIS — E11.9: ICD-10-CM

## 2021-08-02 DIAGNOSIS — Z86.74: ICD-10-CM

## 2021-08-02 DIAGNOSIS — M46.96: ICD-10-CM

## 2021-08-02 DIAGNOSIS — Z86.718: ICD-10-CM

## 2021-08-02 DIAGNOSIS — J45.909: ICD-10-CM

## 2021-08-02 DIAGNOSIS — Z79.899: ICD-10-CM

## 2021-08-02 DIAGNOSIS — I27.20: ICD-10-CM

## 2021-08-02 DIAGNOSIS — M48.061: ICD-10-CM

## 2021-08-02 DIAGNOSIS — F41.9: ICD-10-CM

## 2021-08-02 DIAGNOSIS — Z79.891: ICD-10-CM

## 2021-08-02 DIAGNOSIS — M51.16: ICD-10-CM

## 2021-08-02 DIAGNOSIS — Z86.711: ICD-10-CM

## 2021-08-02 DIAGNOSIS — M47.817: Primary | ICD-10-CM

## 2021-08-02 DIAGNOSIS — M19.90: ICD-10-CM

## 2021-08-02 DIAGNOSIS — Z96.641: ICD-10-CM

## 2021-08-02 PROCEDURE — 64635 DESTROY LUMB/SAC FACET JNT: CPT

## 2021-08-02 PROCEDURE — 72100 X-RAY EXAM L-S SPINE 2/3 VWS: CPT

## 2021-08-02 PROCEDURE — 76000 FLUOROSCOPY <1 HR PHYS/QHP: CPT

## 2021-08-02 PROCEDURE — 64636 DESTROY L/S FACET JNT ADDL: CPT

## 2021-08-02 PROCEDURE — 82962 GLUCOSE BLOOD TEST: CPT

## 2021-08-02 RX ADMIN — LIDOCAINE HYDROCHLORIDE 30 ML: 10 INJECTION, SOLUTION EPIDURAL; INFILTRATION; INTRACAUDAL; PERINEURAL at 08:09

## 2021-09-20 ENCOUNTER — HOSPITAL ENCOUNTER (OUTPATIENT)
Dept: HOSPITAL 100 - SDC | Age: 51
Discharge: HOME | End: 2021-09-20
Payer: MEDICARE

## 2021-09-20 VITALS
SYSTOLIC BLOOD PRESSURE: 145 MMHG | HEART RATE: 82 BPM | DIASTOLIC BLOOD PRESSURE: 84 MMHG | RESPIRATION RATE: 18 BRPM | OXYGEN SATURATION: 94 % | TEMPERATURE: 97.5 F

## 2021-09-20 VITALS
OXYGEN SATURATION: 96 % | DIASTOLIC BLOOD PRESSURE: 91 MMHG | RESPIRATION RATE: 18 BRPM | SYSTOLIC BLOOD PRESSURE: 139 MMHG | HEART RATE: 87 BPM

## 2021-09-20 VITALS
OXYGEN SATURATION: 94 % | HEART RATE: 82 BPM | TEMPERATURE: 97.3 F | DIASTOLIC BLOOD PRESSURE: 75 MMHG | SYSTOLIC BLOOD PRESSURE: 135 MMHG | RESPIRATION RATE: 18 BRPM

## 2021-09-20 VITALS
DIASTOLIC BLOOD PRESSURE: 98 MMHG | RESPIRATION RATE: 18 BRPM | HEART RATE: 80 BPM | SYSTOLIC BLOOD PRESSURE: 137 MMHG | OXYGEN SATURATION: 96 %

## 2021-09-20 VITALS
DIASTOLIC BLOOD PRESSURE: 75 MMHG | SYSTOLIC BLOOD PRESSURE: 135 MMHG | OXYGEN SATURATION: 99 % | TEMPERATURE: 98.5 F | HEART RATE: 77 BPM | RESPIRATION RATE: 18 BRPM

## 2021-09-20 VITALS — SYSTOLIC BLOOD PRESSURE: 135 MMHG | DIASTOLIC BLOOD PRESSURE: 75 MMHG

## 2021-09-20 VITALS
SYSTOLIC BLOOD PRESSURE: 135 MMHG | OXYGEN SATURATION: 96 % | RESPIRATION RATE: 18 BRPM | DIASTOLIC BLOOD PRESSURE: 75 MMHG | HEART RATE: 81 BPM

## 2021-09-20 VITALS — BODY MASS INDEX: 57.2 KG/M2

## 2021-09-20 DIAGNOSIS — K21.9: ICD-10-CM

## 2021-09-20 DIAGNOSIS — Z86.711: ICD-10-CM

## 2021-09-20 DIAGNOSIS — F32.9: ICD-10-CM

## 2021-09-20 DIAGNOSIS — M51.37: ICD-10-CM

## 2021-09-20 DIAGNOSIS — M51.36: ICD-10-CM

## 2021-09-20 DIAGNOSIS — J45.909: ICD-10-CM

## 2021-09-20 DIAGNOSIS — I10: ICD-10-CM

## 2021-09-20 DIAGNOSIS — F41.9: ICD-10-CM

## 2021-09-20 DIAGNOSIS — F17.210: ICD-10-CM

## 2021-09-20 DIAGNOSIS — M47.816: ICD-10-CM

## 2021-09-20 DIAGNOSIS — Z79.01: ICD-10-CM

## 2021-09-20 DIAGNOSIS — M47.817: Primary | ICD-10-CM

## 2021-09-20 DIAGNOSIS — Z79.899: ICD-10-CM

## 2021-09-20 DIAGNOSIS — E66.01: ICD-10-CM

## 2021-09-20 DIAGNOSIS — I27.20: ICD-10-CM

## 2021-09-20 DIAGNOSIS — G47.33: ICD-10-CM

## 2021-09-20 DIAGNOSIS — Z79.84: ICD-10-CM

## 2021-09-20 DIAGNOSIS — Z86.74: ICD-10-CM

## 2021-09-20 DIAGNOSIS — Z86.718: ICD-10-CM

## 2021-09-20 DIAGNOSIS — E11.9: ICD-10-CM

## 2021-09-20 PROCEDURE — 64635 DESTROY LUMB/SAC FACET JNT: CPT

## 2021-09-20 PROCEDURE — 72110 X-RAY EXAM L-2 SPINE 4/>VWS: CPT

## 2021-09-20 PROCEDURE — 01992 ANES DX/THER NRV BLK&INJ PRN: CPT

## 2021-09-20 PROCEDURE — 64636 DESTROY L/S FACET JNT ADDL: CPT

## 2021-09-20 PROCEDURE — 82962 GLUCOSE BLOOD TEST: CPT

## 2021-09-20 PROCEDURE — 76000 FLUOROSCOPY <1 HR PHYS/QHP: CPT

## 2021-09-20 RX ADMIN — LIDOCAINE HYDROCHLORIDE 30 ML: 10 INJECTION, SOLUTION EPIDURAL; INFILTRATION; INTRACAUDAL; PERINEURAL at 07:35

## 2021-09-25 ENCOUNTER — HOSPITAL ENCOUNTER (EMERGENCY)
Age: 51
Discharge: HOME | End: 2021-09-25
Payer: MEDICARE

## 2021-09-25 VITALS
OXYGEN SATURATION: 94 % | DIASTOLIC BLOOD PRESSURE: 91 MMHG | TEMPERATURE: 96.3 F | SYSTOLIC BLOOD PRESSURE: 124 MMHG | RESPIRATION RATE: 16 BRPM | HEART RATE: 93 BPM

## 2021-09-25 VITALS
DIASTOLIC BLOOD PRESSURE: 78 MMHG | RESPIRATION RATE: 16 BRPM | HEART RATE: 82 BPM | SYSTOLIC BLOOD PRESSURE: 149 MMHG | OXYGEN SATURATION: 97 %

## 2021-09-25 VITALS — BODY MASS INDEX: 57.2 KG/M2

## 2021-09-25 DIAGNOSIS — F17.210: ICD-10-CM

## 2021-09-25 DIAGNOSIS — E66.9: ICD-10-CM
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
## 2021-09-25 DIAGNOSIS — S90.31XA: Primary | ICD-10-CM

## 2021-09-25 PROCEDURE — 99283 EMERGENCY DEPT VISIT LOW MDM: CPT

## 2021-09-25 PROCEDURE — 73630 X-RAY EXAM OF FOOT: CPT

## 2021-09-27 ENCOUNTER — HOSPITAL ENCOUNTER (OUTPATIENT)
Age: 51
End: 2021-09-27
Payer: MEDICARE

## 2021-09-27 VITALS — BODY MASS INDEX: 56.2 KG/M2

## 2021-09-27 DIAGNOSIS — J45.909: Primary | ICD-10-CM

## 2021-09-27 PROCEDURE — 94729 DIFFUSING CAPACITY: CPT

## 2021-09-27 PROCEDURE — 94726 PLETHYSMOGRAPHY LUNG VOLUMES: CPT

## 2021-09-27 PROCEDURE — 94060 EVALUATION OF WHEEZING: CPT

## 2021-10-18 ENCOUNTER — HOSPITAL ENCOUNTER (OUTPATIENT)
Dept: HOSPITAL 100 - SDC | Age: 51
Discharge: HOME | End: 2021-10-18
Payer: MEDICARE

## 2021-10-18 VITALS
SYSTOLIC BLOOD PRESSURE: 119 MMHG | TEMPERATURE: 98 F | RESPIRATION RATE: 16 BRPM | HEART RATE: 88 BPM | DIASTOLIC BLOOD PRESSURE: 92 MMHG | OXYGEN SATURATION: 97 %

## 2021-10-18 VITALS
SYSTOLIC BLOOD PRESSURE: 141 MMHG | HEART RATE: 87 BPM | OXYGEN SATURATION: 96 % | RESPIRATION RATE: 16 BRPM | DIASTOLIC BLOOD PRESSURE: 92 MMHG

## 2021-10-18 VITALS
SYSTOLIC BLOOD PRESSURE: 141 MMHG | OXYGEN SATURATION: 95 % | RESPIRATION RATE: 16 BRPM | DIASTOLIC BLOOD PRESSURE: 92 MMHG | HEART RATE: 86 BPM

## 2021-10-18 VITALS
OXYGEN SATURATION: 95 % | SYSTOLIC BLOOD PRESSURE: 141 MMHG | RESPIRATION RATE: 16 BRPM | TEMPERATURE: 97.88 F | HEART RATE: 83 BPM | DIASTOLIC BLOOD PRESSURE: 92 MMHG

## 2021-10-18 VITALS
DIASTOLIC BLOOD PRESSURE: 91 MMHG | TEMPERATURE: 98 F | SYSTOLIC BLOOD PRESSURE: 157 MMHG | RESPIRATION RATE: 16 BRPM | OXYGEN SATURATION: 97 % | HEART RATE: 87 BPM

## 2021-10-18 VITALS
SYSTOLIC BLOOD PRESSURE: 143 MMHG | DIASTOLIC BLOOD PRESSURE: 91 MMHG | RESPIRATION RATE: 16 BRPM | OXYGEN SATURATION: 95 % | HEART RATE: 82 BPM

## 2021-10-18 VITALS — BODY MASS INDEX: 62.4 KG/M2

## 2021-10-18 VITALS — DIASTOLIC BLOOD PRESSURE: 92 MMHG | SYSTOLIC BLOOD PRESSURE: 141 MMHG

## 2021-10-18 DIAGNOSIS — Z86.711: ICD-10-CM

## 2021-10-18 DIAGNOSIS — E11.9: ICD-10-CM

## 2021-10-18 DIAGNOSIS — T37.5X5A: ICD-10-CM

## 2021-10-18 DIAGNOSIS — M48.07: ICD-10-CM

## 2021-10-18 DIAGNOSIS — K21.9: ICD-10-CM

## 2021-10-18 DIAGNOSIS — K71.8: ICD-10-CM

## 2021-10-18 DIAGNOSIS — E66.01: ICD-10-CM

## 2021-10-18 DIAGNOSIS — Z86.74: ICD-10-CM

## 2021-10-18 DIAGNOSIS — M19.90: ICD-10-CM

## 2021-10-18 DIAGNOSIS — J45.909: ICD-10-CM

## 2021-10-18 DIAGNOSIS — G47.33: ICD-10-CM

## 2021-10-18 DIAGNOSIS — M51.17: Primary | ICD-10-CM

## 2021-10-18 DIAGNOSIS — Z79.899: ICD-10-CM

## 2021-10-18 DIAGNOSIS — Z86.718: ICD-10-CM

## 2021-10-18 PROCEDURE — 01992 ANES DX/THER NRV BLK&INJ PRN: CPT

## 2021-10-18 PROCEDURE — 77003 FLUOROGUIDE FOR SPINE INJECT: CPT

## 2021-10-18 PROCEDURE — 62323 NJX INTERLAMINAR LMBR/SAC: CPT

## 2021-10-18 PROCEDURE — 64483 NJX AA&/STRD TFRM EPI L/S 1: CPT

## 2021-10-18 PROCEDURE — 82962 GLUCOSE BLOOD TEST: CPT

## 2021-10-18 PROCEDURE — 3E0S3BZ INTRODUCTION OF ANESTHETIC AGENT INTO EPIDURAL SPACE, PERCUTANEOUS APPROACH: ICD-10-PCS | Performed by: ANESTHESIOLOGY

## 2021-10-18 RX ADMIN — LIDOCAINE HYDROCHLORIDE 20 ML: 10 INJECTION, SOLUTION INFILTRATION; PERINEURAL at 09:41

## 2022-05-16 ENCOUNTER — HOSPITAL ENCOUNTER (OUTPATIENT)
Dept: HOSPITAL 100 - CT | Age: 52
Discharge: HOME | End: 2022-05-16
Payer: MEDICARE

## 2022-05-16 DIAGNOSIS — F17.210: Primary | ICD-10-CM

## 2022-05-16 PROCEDURE — 71271 CT THORAX LUNG CANCER SCR C-: CPT

## 2022-06-20 ENCOUNTER — HOSPITAL ENCOUNTER (OUTPATIENT)
Dept: HOSPITAL 100 - SDC | Age: 52
Discharge: HOME | End: 2022-06-20
Payer: MEDICARE

## 2022-06-20 VITALS
DIASTOLIC BLOOD PRESSURE: 86 MMHG | OXYGEN SATURATION: 97 % | RESPIRATION RATE: 16 BRPM | HEART RATE: 74 BPM | SYSTOLIC BLOOD PRESSURE: 131 MMHG

## 2022-06-20 VITALS
DIASTOLIC BLOOD PRESSURE: 91 MMHG | TEMPERATURE: 98.78 F | SYSTOLIC BLOOD PRESSURE: 127 MMHG | OXYGEN SATURATION: 97 % | HEART RATE: 81 BPM | RESPIRATION RATE: 16 BRPM

## 2022-06-20 VITALS
DIASTOLIC BLOOD PRESSURE: 91 MMHG | OXYGEN SATURATION: 96 % | HEART RATE: 77 BPM | RESPIRATION RATE: 16 BRPM | SYSTOLIC BLOOD PRESSURE: 114 MMHG

## 2022-06-20 VITALS
HEART RATE: 72 BPM | DIASTOLIC BLOOD PRESSURE: 91 MMHG | OXYGEN SATURATION: 99 % | SYSTOLIC BLOOD PRESSURE: 131 MMHG | RESPIRATION RATE: 16 BRPM | TEMPERATURE: 96.26 F

## 2022-06-20 VITALS
OXYGEN SATURATION: 97 % | SYSTOLIC BLOOD PRESSURE: 131 MMHG | RESPIRATION RATE: 16 BRPM | HEART RATE: 85 BPM | DIASTOLIC BLOOD PRESSURE: 91 MMHG

## 2022-06-20 VITALS
SYSTOLIC BLOOD PRESSURE: 131 MMHG | RESPIRATION RATE: 16 BRPM | HEART RATE: 74 BPM | TEMPERATURE: 97.16 F | DIASTOLIC BLOOD PRESSURE: 91 MMHG | OXYGEN SATURATION: 98 %

## 2022-06-20 VITALS
RESPIRATION RATE: 16 BRPM | HEART RATE: 74 BPM | DIASTOLIC BLOOD PRESSURE: 86 MMHG | SYSTOLIC BLOOD PRESSURE: 131 MMHG | OXYGEN SATURATION: 98 %

## 2022-06-20 VITALS — SYSTOLIC BLOOD PRESSURE: 131 MMHG | DIASTOLIC BLOOD PRESSURE: 91 MMHG

## 2022-06-20 VITALS — BODY MASS INDEX: 56 KG/M2

## 2022-06-20 VITALS
DIASTOLIC BLOOD PRESSURE: 91 MMHG | OXYGEN SATURATION: 98 % | RESPIRATION RATE: 16 BRPM | HEART RATE: 75 BPM | SYSTOLIC BLOOD PRESSURE: 131 MMHG

## 2022-06-20 DIAGNOSIS — E11.9: ICD-10-CM

## 2022-06-20 DIAGNOSIS — M47.817: ICD-10-CM

## 2022-06-20 DIAGNOSIS — M51.37: Primary | ICD-10-CM

## 2022-06-20 DIAGNOSIS — M47.816: ICD-10-CM

## 2022-06-20 PROCEDURE — 64635 DESTROY LUMB/SAC FACET JNT: CPT

## 2022-06-20 PROCEDURE — 82962 GLUCOSE BLOOD TEST: CPT

## 2022-06-20 PROCEDURE — 64636 DESTROY L/S FACET JNT ADDL: CPT

## 2022-06-20 PROCEDURE — 76000 FLUOROSCOPY <1 HR PHYS/QHP: CPT

## 2022-06-20 PROCEDURE — 72100 X-RAY EXAM L-S SPINE 2/3 VWS: CPT

## 2022-06-20 RX ADMIN — SODIUM CHLORIDE 10 ML: 9 INJECTION, SOLUTION INTRAMUSCULAR; INTRAVENOUS; SUBCUTANEOUS at 08:24

## 2022-06-20 RX ADMIN — LIDOCAINE HYDROCHLORIDE 5 ML: 10 SOLUTION INTRAVENOUS at 07:56

## 2022-06-20 RX ADMIN — LIDOCAINE HYDROCHLORIDE 5 ML: 10 SOLUTION INTRAVENOUS at 08:27

## 2022-08-15 ENCOUNTER — HOSPITAL ENCOUNTER (OUTPATIENT)
Dept: HOSPITAL 100 - SDC | Age: 52
Discharge: HOME | End: 2022-08-15
Payer: MEDICARE

## 2022-08-15 VITALS
TEMPERATURE: 97.16 F | SYSTOLIC BLOOD PRESSURE: 132 MMHG | RESPIRATION RATE: 18 BRPM | HEART RATE: 74 BPM | OXYGEN SATURATION: 97 % | DIASTOLIC BLOOD PRESSURE: 85 MMHG

## 2022-08-15 VITALS
SYSTOLIC BLOOD PRESSURE: 132 MMHG | HEART RATE: 81 BPM | TEMPERATURE: 97.1 F | DIASTOLIC BLOOD PRESSURE: 76 MMHG | OXYGEN SATURATION: 98 % | RESPIRATION RATE: 20 BRPM

## 2022-08-15 VITALS
HEART RATE: 85 BPM | SYSTOLIC BLOOD PRESSURE: 132 MMHG | DIASTOLIC BLOOD PRESSURE: 85 MMHG | OXYGEN SATURATION: 97 % | TEMPERATURE: 96.9 F | RESPIRATION RATE: 16 BRPM

## 2022-08-15 VITALS
SYSTOLIC BLOOD PRESSURE: 110 MMHG | HEART RATE: 78 BPM | RESPIRATION RATE: 16 BRPM | OXYGEN SATURATION: 94 % | DIASTOLIC BLOOD PRESSURE: 66 MMHG

## 2022-08-15 VITALS
SYSTOLIC BLOOD PRESSURE: 133 MMHG | OXYGEN SATURATION: 96 % | RESPIRATION RATE: 16 BRPM | HEART RATE: 80 BPM | DIASTOLIC BLOOD PRESSURE: 85 MMHG

## 2022-08-15 VITALS
DIASTOLIC BLOOD PRESSURE: 76 MMHG | SYSTOLIC BLOOD PRESSURE: 132 MMHG | RESPIRATION RATE: 16 BRPM | HEART RATE: 76 BPM | OXYGEN SATURATION: 96 %

## 2022-08-15 VITALS — BODY MASS INDEX: 49.2 KG/M2

## 2022-08-15 VITALS
RESPIRATION RATE: 16 BRPM | OXYGEN SATURATION: 95 % | DIASTOLIC BLOOD PRESSURE: 85 MMHG | HEART RATE: 82 BPM | SYSTOLIC BLOOD PRESSURE: 120 MMHG

## 2022-08-15 DIAGNOSIS — F32.A: ICD-10-CM

## 2022-08-15 DIAGNOSIS — Z86.74: ICD-10-CM

## 2022-08-15 DIAGNOSIS — Z86.718: ICD-10-CM

## 2022-08-15 DIAGNOSIS — Z86.711: ICD-10-CM

## 2022-08-15 DIAGNOSIS — M47.817: ICD-10-CM

## 2022-08-15 DIAGNOSIS — F41.9: ICD-10-CM

## 2022-08-15 DIAGNOSIS — K21.9: ICD-10-CM

## 2022-08-15 DIAGNOSIS — M47.816: Primary | ICD-10-CM

## 2022-08-15 DIAGNOSIS — I10: ICD-10-CM

## 2022-08-15 DIAGNOSIS — E11.9: ICD-10-CM

## 2022-08-15 DIAGNOSIS — Z79.4: ICD-10-CM

## 2022-08-15 DIAGNOSIS — M51.37: ICD-10-CM

## 2022-08-15 DIAGNOSIS — F17.201: ICD-10-CM

## 2022-08-15 PROCEDURE — 64635 DESTROY LUMB/SAC FACET JNT: CPT

## 2022-08-15 PROCEDURE — 82962 GLUCOSE BLOOD TEST: CPT

## 2022-08-15 PROCEDURE — 72100 X-RAY EXAM L-S SPINE 2/3 VWS: CPT

## 2022-08-15 PROCEDURE — 76000 FLUOROSCOPY <1 HR PHYS/QHP: CPT

## 2022-08-15 RX ADMIN — LIDOCAINE HYDROCHLORIDE 5 ML: 10 SOLUTION INTRAVENOUS at 07:40

## 2022-10-17 ENCOUNTER — HOSPITAL ENCOUNTER (OUTPATIENT)
Dept: HOSPITAL 100 - SDC | Age: 52
Discharge: HOME | End: 2022-10-17
Payer: MEDICARE

## 2022-10-17 VITALS
DIASTOLIC BLOOD PRESSURE: 76 MMHG | SYSTOLIC BLOOD PRESSURE: 131 MMHG | OXYGEN SATURATION: 93 % | TEMPERATURE: 97.8 F | RESPIRATION RATE: 19 BRPM | HEART RATE: 90 BPM

## 2022-10-17 VITALS
OXYGEN SATURATION: 95 % | SYSTOLIC BLOOD PRESSURE: 109 MMHG | HEART RATE: 74 BPM | DIASTOLIC BLOOD PRESSURE: 76 MMHG | RESPIRATION RATE: 18 BRPM

## 2022-10-17 VITALS
TEMPERATURE: 97.6 F | OXYGEN SATURATION: 95 % | RESPIRATION RATE: 16 BRPM | HEART RATE: 73 BPM | DIASTOLIC BLOOD PRESSURE: 74 MMHG | SYSTOLIC BLOOD PRESSURE: 110 MMHG

## 2022-10-17 VITALS
HEART RATE: 73 BPM | TEMPERATURE: 96.8 F | RESPIRATION RATE: 16 BRPM | SYSTOLIC BLOOD PRESSURE: 111 MMHG | OXYGEN SATURATION: 93 % | DIASTOLIC BLOOD PRESSURE: 76 MMHG

## 2022-10-17 VITALS
SYSTOLIC BLOOD PRESSURE: 114 MMHG | RESPIRATION RATE: 18 BRPM | DIASTOLIC BLOOD PRESSURE: 68 MMHG | OXYGEN SATURATION: 92 % | HEART RATE: 71 BPM

## 2022-10-17 VITALS — DIASTOLIC BLOOD PRESSURE: 76 MMHG | SYSTOLIC BLOOD PRESSURE: 131 MMHG

## 2022-10-17 VITALS — BODY MASS INDEX: 48.5 KG/M2

## 2022-10-17 DIAGNOSIS — Z79.899: ICD-10-CM

## 2022-10-17 DIAGNOSIS — M48.07: ICD-10-CM

## 2022-10-17 DIAGNOSIS — F17.210: ICD-10-CM

## 2022-10-17 DIAGNOSIS — M51.17: Primary | ICD-10-CM

## 2022-10-17 DIAGNOSIS — Z86.74: ICD-10-CM

## 2022-10-17 DIAGNOSIS — E66.01: ICD-10-CM

## 2022-10-17 DIAGNOSIS — I10: ICD-10-CM

## 2022-10-17 DIAGNOSIS — Z79.85: ICD-10-CM

## 2022-10-17 DIAGNOSIS — E11.9: ICD-10-CM

## 2022-10-17 DIAGNOSIS — Z79.4: ICD-10-CM

## 2022-10-17 PROCEDURE — 3E0S3BZ INTRODUCTION OF ANESTHETIC AGENT INTO EPIDURAL SPACE, PERCUTANEOUS APPROACH: ICD-10-PCS | Performed by: ANESTHESIOLOGY

## 2022-10-17 PROCEDURE — 62323 NJX INTERLAMINAR LMBR/SAC: CPT

## 2022-10-17 PROCEDURE — 64483 NJX AA&/STRD TFRM EPI L/S 1: CPT

## 2022-10-17 PROCEDURE — 01992 ANES DX/THER NRV BLK&INJ PRN: CPT

## 2022-10-17 PROCEDURE — 77003 FLUOROGUIDE FOR SPINE INJECT: CPT

## 2022-10-17 PROCEDURE — 82962 GLUCOSE BLOOD TEST: CPT

## 2022-10-17 RX ADMIN — SODIUM CHLORIDE 10 ML: 9 INJECTION, SOLUTION INTRAMUSCULAR; INTRAVENOUS; SUBCUTANEOUS at 09:44

## 2022-10-17 RX ADMIN — LIDOCAINE HYDROCHLORIDE 30 ML: 10 INJECTION, SOLUTION EPIDURAL; INFILTRATION; INTRACAUDAL; PERINEURAL at 09:44

## 2023-04-17 ENCOUNTER — HOSPITAL ENCOUNTER (OUTPATIENT)
Dept: HOSPITAL 100 - SDC | Age: 53
Discharge: HOME | End: 2023-04-17
Payer: MEDICARE

## 2023-04-17 VITALS
RESPIRATION RATE: 16 BRPM | HEART RATE: 87 BPM | DIASTOLIC BLOOD PRESSURE: 98 MMHG | OXYGEN SATURATION: 96 % | SYSTOLIC BLOOD PRESSURE: 119 MMHG

## 2023-04-17 VITALS — SYSTOLIC BLOOD PRESSURE: 156 MMHG | DIASTOLIC BLOOD PRESSURE: 96 MMHG

## 2023-04-17 VITALS
SYSTOLIC BLOOD PRESSURE: 109 MMHG | OXYGEN SATURATION: 93 % | RESPIRATION RATE: 18 BRPM | DIASTOLIC BLOOD PRESSURE: 58 MMHG | HEART RATE: 92 BPM

## 2023-04-17 VITALS
OXYGEN SATURATION: 92 % | HEART RATE: 96 BPM | TEMPERATURE: 97.9 F | RESPIRATION RATE: 18 BRPM | SYSTOLIC BLOOD PRESSURE: 156 MMHG | DIASTOLIC BLOOD PRESSURE: 96 MMHG

## 2023-04-17 VITALS
DIASTOLIC BLOOD PRESSURE: 63 MMHG | RESPIRATION RATE: 16 BRPM | OXYGEN SATURATION: 94 % | TEMPERATURE: 98.42 F | SYSTOLIC BLOOD PRESSURE: 156 MMHG | HEART RATE: 89 BPM

## 2023-04-17 VITALS
OXYGEN SATURATION: 94 % | DIASTOLIC BLOOD PRESSURE: 40 MMHG | RESPIRATION RATE: 18 BRPM | SYSTOLIC BLOOD PRESSURE: 103 MMHG | HEART RATE: 89 BPM

## 2023-04-17 VITALS
SYSTOLIC BLOOD PRESSURE: 118 MMHG | OXYGEN SATURATION: 94 % | HEART RATE: 86 BPM | DIASTOLIC BLOOD PRESSURE: 96 MMHG | TEMPERATURE: 97.16 F | RESPIRATION RATE: 16 BRPM

## 2023-04-17 VITALS — BODY MASS INDEX: 51.4 KG/M2

## 2023-04-17 DIAGNOSIS — M51.17: ICD-10-CM

## 2023-04-17 DIAGNOSIS — J98.4: ICD-10-CM

## 2023-04-17 DIAGNOSIS — Z86.718: ICD-10-CM

## 2023-04-17 DIAGNOSIS — F17.210: ICD-10-CM

## 2023-04-17 DIAGNOSIS — K21.9: ICD-10-CM

## 2023-04-17 DIAGNOSIS — E11.9: ICD-10-CM

## 2023-04-17 DIAGNOSIS — I27.20: ICD-10-CM

## 2023-04-17 DIAGNOSIS — F32.9: ICD-10-CM

## 2023-04-17 DIAGNOSIS — Z79.4: ICD-10-CM

## 2023-04-17 DIAGNOSIS — Z79.891: ICD-10-CM

## 2023-04-17 DIAGNOSIS — M47.817: Primary | ICD-10-CM

## 2023-04-17 DIAGNOSIS — M79.2: ICD-10-CM

## 2023-04-17 DIAGNOSIS — E66.01: ICD-10-CM

## 2023-04-17 DIAGNOSIS — K76.0: ICD-10-CM

## 2023-04-17 DIAGNOSIS — Z79.899: ICD-10-CM

## 2023-04-17 DIAGNOSIS — F41.9: ICD-10-CM

## 2023-04-17 DIAGNOSIS — I10: ICD-10-CM

## 2023-04-17 DIAGNOSIS — Z86.711: ICD-10-CM

## 2023-04-17 DIAGNOSIS — J45.909: ICD-10-CM

## 2023-04-17 DIAGNOSIS — G47.33: ICD-10-CM

## 2023-04-17 DIAGNOSIS — Z79.01: ICD-10-CM

## 2023-04-17 DIAGNOSIS — M46.96: ICD-10-CM

## 2023-04-17 PROCEDURE — 64636 DESTROY L/S FACET JNT ADDL: CPT

## 2023-04-17 PROCEDURE — 01992 ANES DX/THER NRV BLK&INJ PRN: CPT

## 2023-04-17 PROCEDURE — 76000 FLUOROSCOPY <1 HR PHYS/QHP: CPT

## 2023-04-17 PROCEDURE — 64635 DESTROY LUMB/SAC FACET JNT: CPT

## 2023-04-17 PROCEDURE — 72110 X-RAY EXAM L-2 SPINE 4/>VWS: CPT

## 2023-04-17 PROCEDURE — 82962 GLUCOSE BLOOD TEST: CPT

## 2023-04-17 RX ADMIN — LIDOCAINE HYDROCHLORIDE 30 ML: 10 INJECTION, SOLUTION EPIDURAL; INFILTRATION; INTRACAUDAL; PERINEURAL at 08:39

## 2023-05-18 ENCOUNTER — HOSPITAL ENCOUNTER (OUTPATIENT)
Dept: HOSPITAL 100 - CT | Age: 53
Discharge: HOME | End: 2023-05-18
Payer: MEDICARE

## 2023-05-18 DIAGNOSIS — F17.210: ICD-10-CM

## 2023-05-18 DIAGNOSIS — Z12.2: Primary | ICD-10-CM

## 2023-05-18 PROCEDURE — 71271 CT THORAX LUNG CANCER SCR C-: CPT

## 2023-06-05 ENCOUNTER — HOSPITAL ENCOUNTER (OUTPATIENT)
Age: 53
Discharge: HOME | End: 2023-06-05
Payer: MEDICARE

## 2023-06-05 ENCOUNTER — HOSPITAL ENCOUNTER (OUTPATIENT)
Dept: HOSPITAL 100 - RAD | Age: 53
Discharge: HOME | End: 2023-06-05
Payer: MEDICARE

## 2023-06-05 VITALS
SYSTOLIC BLOOD PRESSURE: 106 MMHG | DIASTOLIC BLOOD PRESSURE: 90 MMHG | HEART RATE: 87 BPM | RESPIRATION RATE: 16 BRPM | OXYGEN SATURATION: 95 %

## 2023-06-05 VITALS
RESPIRATION RATE: 16 BRPM | DIASTOLIC BLOOD PRESSURE: 76 MMHG | OXYGEN SATURATION: 95 % | SYSTOLIC BLOOD PRESSURE: 100 MMHG | HEART RATE: 88 BPM | TEMPERATURE: 98 F

## 2023-06-05 VITALS
RESPIRATION RATE: 16 BRPM | DIASTOLIC BLOOD PRESSURE: 91 MMHG | HEART RATE: 87 BPM | SYSTOLIC BLOOD PRESSURE: 112 MMHG | OXYGEN SATURATION: 95 %

## 2023-06-05 VITALS
SYSTOLIC BLOOD PRESSURE: 112 MMHG | RESPIRATION RATE: 16 BRPM | OXYGEN SATURATION: 95 % | HEART RATE: 92 BPM | DIASTOLIC BLOOD PRESSURE: 75 MMHG

## 2023-06-05 VITALS
OXYGEN SATURATION: 93 % | DIASTOLIC BLOOD PRESSURE: 76 MMHG | HEART RATE: 86 BPM | SYSTOLIC BLOOD PRESSURE: 128 MMHG | RESPIRATION RATE: 16 BRPM | TEMPERATURE: 97.16 F

## 2023-06-05 VITALS
RESPIRATION RATE: 20 BRPM | DIASTOLIC BLOOD PRESSURE: 76 MMHG | SYSTOLIC BLOOD PRESSURE: 112 MMHG | OXYGEN SATURATION: 98 % | HEART RATE: 81 BPM | TEMPERATURE: 97.7 F

## 2023-06-05 VITALS
DIASTOLIC BLOOD PRESSURE: 74 MMHG | SYSTOLIC BLOOD PRESSURE: 112 MMHG | HEART RATE: 89 BPM | OXYGEN SATURATION: 95 % | RESPIRATION RATE: 16 BRPM

## 2023-06-05 VITALS — DIASTOLIC BLOOD PRESSURE: 76 MMHG | SYSTOLIC BLOOD PRESSURE: 112 MMHG

## 2023-06-05 VITALS — BODY MASS INDEX: 53.1 KG/M2

## 2023-06-05 DIAGNOSIS — M25.512: ICD-10-CM

## 2023-06-05 DIAGNOSIS — Z86.74: ICD-10-CM

## 2023-06-05 DIAGNOSIS — M51.37: ICD-10-CM

## 2023-06-05 DIAGNOSIS — I10: ICD-10-CM

## 2023-06-05 DIAGNOSIS — Z79.899: ICD-10-CM

## 2023-06-05 DIAGNOSIS — Z79.85: ICD-10-CM

## 2023-06-05 DIAGNOSIS — M47.817: Primary | ICD-10-CM

## 2023-06-05 DIAGNOSIS — Z79.4: ICD-10-CM

## 2023-06-05 DIAGNOSIS — I27.20: ICD-10-CM

## 2023-06-05 DIAGNOSIS — M25.511: ICD-10-CM

## 2023-06-05 DIAGNOSIS — E11.9: ICD-10-CM

## 2023-06-05 DIAGNOSIS — M25.511: Primary | ICD-10-CM

## 2023-06-05 DIAGNOSIS — F17.210: ICD-10-CM

## 2023-06-05 DIAGNOSIS — M46.96: ICD-10-CM

## 2023-06-05 DIAGNOSIS — E66.01: ICD-10-CM

## 2023-06-05 PROCEDURE — 72100 X-RAY EXAM L-S SPINE 2/3 VWS: CPT

## 2023-06-05 PROCEDURE — 73030 X-RAY EXAM OF SHOULDER: CPT

## 2023-06-05 PROCEDURE — 64636 DESTROY L/S FACET JNT ADDL: CPT

## 2023-06-05 PROCEDURE — 01992 ANES DX/THER NRV BLK&INJ PRN: CPT

## 2023-06-05 PROCEDURE — 64635 DESTROY LUMB/SAC FACET JNT: CPT

## 2023-06-05 PROCEDURE — 82962 GLUCOSE BLOOD TEST: CPT

## 2023-06-05 PROCEDURE — 76000 FLUOROSCOPY <1 HR PHYS/QHP: CPT

## 2023-07-03 ENCOUNTER — HOSPITAL ENCOUNTER (OUTPATIENT)
Dept: HOSPITAL 100 - MRI | Age: 53
Discharge: HOME | End: 2023-07-03
Payer: MEDICARE

## 2023-07-03 DIAGNOSIS — M25.511: Primary | ICD-10-CM

## 2023-07-03 PROCEDURE — 73221 MRI JOINT UPR EXTREM W/O DYE: CPT

## 2023-09-18 ENCOUNTER — HOSPITAL ENCOUNTER (OUTPATIENT)
Dept: HOSPITAL 100 - SDC | Age: 53
Discharge: HOME | End: 2023-09-18
Payer: MEDICARE

## 2023-09-18 VITALS — BODY MASS INDEX: 53.7 KG/M2

## 2023-09-18 VITALS
HEART RATE: 81 BPM | SYSTOLIC BLOOD PRESSURE: 144 MMHG | OXYGEN SATURATION: 100 % | DIASTOLIC BLOOD PRESSURE: 95 MMHG | RESPIRATION RATE: 16 BRPM | TEMPERATURE: 98.6 F

## 2023-09-18 VITALS
DIASTOLIC BLOOD PRESSURE: 95 MMHG | SYSTOLIC BLOOD PRESSURE: 144 MMHG | HEART RATE: 81 BPM | RESPIRATION RATE: 18 BRPM | OXYGEN SATURATION: 96 %

## 2023-09-18 VITALS
RESPIRATION RATE: 18 BRPM | TEMPERATURE: 98.6 F | DIASTOLIC BLOOD PRESSURE: 104 MMHG | HEART RATE: 88 BPM | SYSTOLIC BLOOD PRESSURE: 118 MMHG | OXYGEN SATURATION: 97 %

## 2023-09-18 VITALS — SYSTOLIC BLOOD PRESSURE: 144 MMHG | DIASTOLIC BLOOD PRESSURE: 95 MMHG

## 2023-09-18 VITALS
RESPIRATION RATE: 18 BRPM | HEART RATE: 82 BPM | DIASTOLIC BLOOD PRESSURE: 96 MMHG | OXYGEN SATURATION: 96 % | SYSTOLIC BLOOD PRESSURE: 143 MMHG

## 2023-09-18 VITALS
SYSTOLIC BLOOD PRESSURE: 105 MMHG | HEART RATE: 79 BPM | OXYGEN SATURATION: 94 % | RESPIRATION RATE: 20 BRPM | DIASTOLIC BLOOD PRESSURE: 90 MMHG | TEMPERATURE: 98.6 F

## 2023-09-18 DIAGNOSIS — M48.07: ICD-10-CM

## 2023-09-18 DIAGNOSIS — Z79.899: ICD-10-CM

## 2023-09-18 DIAGNOSIS — Z79.4: ICD-10-CM

## 2023-09-18 DIAGNOSIS — M47.816: ICD-10-CM

## 2023-09-18 DIAGNOSIS — F17.210: ICD-10-CM

## 2023-09-18 DIAGNOSIS — F41.9: ICD-10-CM

## 2023-09-18 DIAGNOSIS — M51.36: ICD-10-CM

## 2023-09-18 DIAGNOSIS — Z86.718: ICD-10-CM

## 2023-09-18 DIAGNOSIS — M51.17: Primary | ICD-10-CM

## 2023-09-18 DIAGNOSIS — G47.33: ICD-10-CM

## 2023-09-18 DIAGNOSIS — I10: ICD-10-CM

## 2023-09-18 DIAGNOSIS — Z86.711: ICD-10-CM

## 2023-09-18 DIAGNOSIS — K76.0: ICD-10-CM

## 2023-09-18 DIAGNOSIS — E11.9: ICD-10-CM

## 2023-09-18 PROCEDURE — 82962 GLUCOSE BLOOD TEST: CPT

## 2023-09-18 PROCEDURE — 64483 NJX AA&/STRD TFRM EPI L/S 1: CPT

## 2023-09-18 PROCEDURE — 62323 NJX INTERLAMINAR LMBR/SAC: CPT

## 2023-09-18 PROCEDURE — 77003 FLUOROGUIDE FOR SPINE INJECT: CPT

## 2023-09-18 PROCEDURE — 01992 ANES DX/THER NRV BLK&INJ PRN: CPT

## 2023-09-18 PROCEDURE — 3E0S3BZ INTRODUCTION OF ANESTHETIC AGENT INTO EPIDURAL SPACE, PERCUTANEOUS APPROACH: ICD-10-PCS | Performed by: ANESTHESIOLOGY

## 2023-10-06 PROBLEM — M54.42 CHRONIC BILATERAL LOW BACK PAIN WITH BILATERAL SCIATICA: Status: ACTIVE | Noted: 2023-10-06

## 2023-10-06 PROBLEM — G89.29 CHRONIC BILATERAL LOW BACK PAIN WITH BILATERAL SCIATICA: Status: ACTIVE | Noted: 2023-10-06

## 2023-10-06 PROBLEM — M65.811 OTHER SYNOVITIS AND TENOSYNOVITIS, RIGHT SHOULDER: Status: ACTIVE | Noted: 2023-10-06

## 2023-10-06 PROBLEM — M19.011 ARTHRITIS OF RIGHT SHOULDER REGION: Status: ACTIVE | Noted: 2023-10-06

## 2023-10-06 PROBLEM — M19.012 ARTHRITIS OF LEFT SHOULDER REGION: Status: ACTIVE | Noted: 2023-10-06

## 2023-10-06 PROBLEM — G89.29 CHRONIC LEFT SHOULDER PAIN: Status: ACTIVE | Noted: 2023-10-06

## 2023-10-06 PROBLEM — M54.41 CHRONIC BILATERAL LOW BACK PAIN WITH BILATERAL SCIATICA: Status: ACTIVE | Noted: 2023-10-06

## 2023-10-06 PROBLEM — M75.42 IMPINGEMENT SYNDROME, SHOULDER, LEFT: Status: ACTIVE | Noted: 2023-10-06

## 2023-10-06 PROBLEM — M25.512 CHRONIC LEFT SHOULDER PAIN: Status: ACTIVE | Noted: 2023-10-06

## 2023-10-06 PROBLEM — M25.551 HIP PAIN, RIGHT: Status: ACTIVE | Noted: 2023-10-06

## 2023-10-09 ENCOUNTER — TREATMENT (OUTPATIENT)
Dept: PHYSICAL THERAPY | Facility: CLINIC | Age: 53
End: 2023-10-09
Payer: MEDICARE

## 2023-10-09 DIAGNOSIS — M75.42 IMPINGEMENT SYNDROME, SHOULDER, LEFT: ICD-10-CM

## 2023-10-09 DIAGNOSIS — M19.012 ARTHRITIS OF LEFT SHOULDER REGION: ICD-10-CM

## 2023-10-09 DIAGNOSIS — M25.511 CHRONIC RIGHT SHOULDER PAIN: Primary | ICD-10-CM

## 2023-10-09 DIAGNOSIS — M65.811 OTHER SYNOVITIS AND TENOSYNOVITIS, RIGHT SHOULDER: ICD-10-CM

## 2023-10-09 DIAGNOSIS — G89.29 CHRONIC RIGHT SHOULDER PAIN: Primary | ICD-10-CM

## 2023-10-09 PROCEDURE — 97110 THERAPEUTIC EXERCISES: CPT | Mod: GP,CQ

## 2023-10-09 ASSESSMENT — PAIN - FUNCTIONAL ASSESSMENT: PAIN_FUNCTIONAL_ASSESSMENT: 0-10

## 2023-10-09 ASSESSMENT — PAIN SCALES - GENERAL: PAINLEVEL_OUTOF10: 6

## 2023-10-09 NOTE — PROGRESS NOTES
"Physical Therapy Treatment    Patient Name: Aaron Bartholomew Jr.  MRN: [id number]  Today's Date: 10/9/2023  Time Calculation  Start Time: 0116  Stop Time: 0155  Time Calculation (min): 39 min      Assessment:   Patient identified by name and   Patient able to tolerate CKC and stabilization exercises with no c/o increased symptoms. Trigger point dry needling performed this date by Avelina Dunham without any adverse reactions and with less myofascial restrictions throughout upper trap.    Plan:   Continue to work on improving strength and ROM to be able to perform normal work duties with little to no difficulty. -AB.     Current Problem  1. Chronic right shoulder pain        2. Impingement syndrome, shoulder, left        3. Arthritis of left shoulder region        4. Other synovitis and tenosynovitis, right shoulder            Subjective   General   Patient states that his pain is a 6 this date. Pt noted he has to leave early today and doesn't have time for full session  Precautions  Precautions  Precautions Comment: Fall Risk: none    Pain  Pain Assessment: 0-10  Pain Score: 6  Pain Location: Shoulder  Pain Orientation: Other (Comment) (Bilat)      Treatments:  Therapeutic Exercise: 23 minutes, 2 units  scap retraction x 10 x5\" (X, not today)  UBE 3' fwd 3' bwd   Pulley 3' flex  T-band IR/ER Blue band 2 x 10 (X, not today)  Resisted rows Pink tube 2 x 10 (X, not today)  Resisted extension Pink tube 2 x 10 (X, not today)   Ball on wall 2 x 10   - up/down  - H ADD,H ABD  - CW/CCW   BOSU side to side 2x10 (N)  BOSU CW/CCW 2x10 (N)  Supine Rhythx andreina Stabilization at 90 shld flex 3 x 20\" (X, not today)   CKC Weight Shifts B/L 2 x 10  CKC Rotation on plinth 2 x 10 B/L (B/L).       Manual Therapy:  STM to Left supraspinatus, UT, LS(X)  KT Tape for scap retraction/depression, AC joint space correction to L GH joint   IASTM 10' (X)  TDN:  60mm to B/L UT  40mm to periscapular musculature B/L.      Provided today: education " ". Handout given for progression of HEP this date. Patient verbalized and demo'd understanding of correct form and technique. Exercises given are listed below:  1. Resisted row  2. resisted ext  3. resisted ER  4. resisted IR.     OP EDUCATION:       Goals:  Encounter Problems       Encounter Problems (Active)       PT Problem       PT Goal 1       Start:  10/09/23    Expected End:  12/05/23       By discharge MY BENDER will achieve the following goals:      Pt will demo and report compliance with HEP in order to augment POC goals and progression toward independence with symptom management for better outcomes once D/C from POC.     , by week 2, goal met   Strength: Pt will demo improved MMT by >/= 1 point on 0-5 point scale in BUE's for improved strength and stability, and improved ease with lifting, carrying, and proper mechanics with ADL's & IADL's.      , goal partially met   QUICKDASH, Pt will report subjective improvement with score on QUICKDASH improved by >/= 5 points for return to PLOF, improved QOL, and improved ease with ADL's & IADL\"s.      , goal partially met      Planned interventions include: cryotherapy, dry needling, education/instruction, electrical stimulation, home program, hot pack, kinesiotaping, manual therapy, self care/home management, therapeutic activities, therapeutic exercises, ultrasound, vasopneumatic device, vasopneumatic device w/ cold and IASTM/CUPPING . Focus on stability with decreased repetitive movement; CKC positions on elevated plinth as able; TDN and IASTM; KT tape;.   Frequency and duration: 2 time(s) a week, for 4 weeks, for 8 visits.   Potential to achieve rehab goals is fair:                   "

## 2023-10-16 ENCOUNTER — TREATMENT (OUTPATIENT)
Dept: PHYSICAL THERAPY | Facility: CLINIC | Age: 53
End: 2023-10-16
Payer: MEDICARE

## 2023-10-16 DIAGNOSIS — M19.011 ARTHRITIS OF RIGHT SHOULDER REGION: ICD-10-CM

## 2023-10-16 DIAGNOSIS — G89.29 CHRONIC LEFT SHOULDER PAIN: ICD-10-CM

## 2023-10-16 DIAGNOSIS — M65.811 OTHER SYNOVITIS AND TENOSYNOVITIS, RIGHT SHOULDER: ICD-10-CM

## 2023-10-16 DIAGNOSIS — M19.012 ARTHRITIS OF LEFT SHOULDER REGION: ICD-10-CM

## 2023-10-16 DIAGNOSIS — M25.512 CHRONIC LEFT SHOULDER PAIN: ICD-10-CM

## 2023-10-16 DIAGNOSIS — M25.511 CHRONIC RIGHT SHOULDER PAIN: ICD-10-CM

## 2023-10-16 DIAGNOSIS — G89.29 CHRONIC RIGHT SHOULDER PAIN: ICD-10-CM

## 2023-10-16 DIAGNOSIS — M75.42 IMPINGEMENT SYNDROME, SHOULDER, LEFT: ICD-10-CM

## 2023-10-16 DIAGNOSIS — M51.36 DEGENERATION OF LUMBAR INTERVERTEBRAL DISC: ICD-10-CM

## 2023-10-16 DIAGNOSIS — M54.50 LUMBAGO: Primary | ICD-10-CM

## 2023-10-16 PROCEDURE — 97140 MANUAL THERAPY 1/> REGIONS: CPT | Mod: GP | Performed by: PHYSICAL THERAPIST

## 2023-10-16 PROCEDURE — 97110 THERAPEUTIC EXERCISES: CPT | Mod: GP | Performed by: PHYSICAL THERAPIST

## 2023-10-16 PROCEDURE — 97164 PT RE-EVAL EST PLAN CARE: CPT | Mod: GP,59 | Performed by: PHYSICAL THERAPIST

## 2023-10-16 ASSESSMENT — PAIN - FUNCTIONAL ASSESSMENT: PAIN_FUNCTIONAL_ASSESSMENT: 0-10

## 2023-10-16 ASSESSMENT — ENCOUNTER SYMPTOMS
DEPRESSION: 0
LOSS OF SENSATION IN FEET: 0
OCCASIONAL FEELINGS OF UNSTEADINESS: 0

## 2023-10-16 ASSESSMENT — PAIN SCALES - GENERAL: PAINLEVEL_OUTOF10: 4

## 2023-10-16 NOTE — PROGRESS NOTES
Physical Therapy    Physical Therapy Lumbar Spine Evaluation    Patient Name: Aaron Bartholomew Jr.  MRN: [id number]  Today's Date: 10/16/2023  Time Calculation  Start Time: 1435  Stop Time: 1520  Time Calculation (min): 45 min    Current Problem  Problem List Items Addressed This Visit             ICD-10-CM    Lumbago - Primary M54.50    Chronic left shoulder pain M25.512, G89.29    Arthritis of left shoulder region M19.012    Arthritis of right shoulder region M19.011    Impingement syndrome, shoulder, left M75.42    Other synovitis and tenosynovitis, right shoulder M65.811    Chronic right shoulder pain M25.511, G89.29    Degeneration of lumbar intervertebral disc M51.36          Assessment:  Re-eval performed this date due to pt presenting with additional scrip to add lumbar spine to POC. Due to good progress made on Shoulder POC, focused on lumbar region this date with pt presenting with good ROM and flexibility, however demonstrates poor stability in lumbosacral region. IS functionally limited with all transfers and prolonged standing, as well as lifting/carrying for ADL's and work. Pt with poor TrA activation, especially with mobility. Responded well to STM and TDN this date with decreased pain following session. Pt reported good understanding of edu on body mechanics and posture, as well as symptom management with use of cryotherapy at end of day to address inflammation. Verbally reviewed HEP HO with focus on seated TrA, Pelvic floor, and Glute retraining and stability exercises with small ranges and focus on isometric holds.     PT Assessment Results: Decreased strength, Decreased mobility, Pain  Rehab Prognosis: Good  Evaluation/Treatment Tolerance: Patient tolerated treatment well  Barriers to Participation: Comorbidities    Plan:  Plan to continue with manual therapy, including dry needling per script from MD, as well as focus on progression of core and glute stabilization to improve ease with transfer and  ADL's/IADL's.     Treatment/Interventions: Cryotherapy, Dry needling, Education/ Instruction, Manual therapy, Neuromuscular re-education, Self care/ home management, Therapeutic activities, Therapeutic exercises, Other (comment) (Unique/IASTM)  PT Plan: Skilled PT  PT Frequency: 2 times per week  Duration: 4 weeks  Rehab Potential: Good  Plan of Care Agreement: Patient    Subjective    General  Reason for Referral: LBP  Pt presented this date to continue POC for shoulders, but with new script to add lumbar spine. Chronic lumbago but with acute exacerbation last week. Does get radiating pain to posterior thighs at times. Does get muscle cramps.   Precautions  Precautions  STEADI Fall Risk Score (The score of 4 or more indicates an increased risk of falling): 0  Precautions Comment: Fall risk: None; Cancer treatment but cleared for manual and TDN       Pain:  Pain Assessment  Pain Assessment: 0-10  Pain Score: 4  Pain Type: Chronic pain  Pain Location: Back  Pain Orientation: Lower, Right, Left  Pain Radiating Towards: posterior thighs, typically  Pain Descriptors: Aching, Cramping, Radiating, Sharp, Shooting, Pins and needles  Pain Frequency: Constant/continuous  Home Living:     Prior Level of Function:  Prior Function Per Pt/Caregiver Report  Level of [place]: Independent with ADLs and functional transfersindependent    Objective   OBJECTIVE:    Lumbar ROM:  Date: eval Percentage    Flexion 90%    Extension 100%     RIGHT LEFT   Side bend 100% 100%   Rotation 100% 100%     Lower Extremity Strength:  MMT 5/5 max  RIGHT LEFT   Hip Flexion 4 5   Hip Extension N/T N/T   Hip Abduction Seated 4-/5 Seated 4/5   Hip Adduction Seated 4-/5 Seated 4/5   Knee Extension 4/5 4/5   Knee Flexion 4/5 4/5   Ankle DF 4+/5 4+/5   Ankle PF N/T N/T   Ankle INV N/T N/T   Ankle EV N/T N/T     Joint mobility deferred  Palpation: Myofascial restrictions throughout bilateral hip flexors, TFL, Glutes, QL, and lumbar  "paraspinals    TREATMENT:  Lumbar POC Treatment  Ther-ex:  Attempted UBE with BUE/BLEs but stopped BLE after few minutes  NUStep (A)  Hooklying TrA with alternated resisted Hip ABD ISO (A)  Seated TrA LAQ (A)  Seated TrA Row (A)  Verbal review of HEP HO; Pt edu on body mechanics for transfers as well as symptom management    Manual Therapy:  STM hip flexor B/L (N)  TDN: 100mm R QL and Glutes (N)  Passive hip flexor stretch (A)    Shoulder POC Treatment:  On hold starting 10/16/Resume if necessary:  Therapeutic Exercise:   scap retraction x 10 x5\" (X, not today)  UBE 3' fwd 3' bwd   Pulley 3' flex10 (X, not today)  Resisted rows Pink tube 2 x 10 (X, not today)  Resisted extension Pink tube 2 x 10 (X, not today)   Ball on wall 2 x 10   - up/down  - H ADD,H ABD  - CW/CCW   BOSU side to side 2x10 (N)  BOSU CW/CCW 2x10 (N)  Supine Rhythx andreina Stabilization at 90 shld flex 3 x 20\" (X, not today)   CKC Weight Shifts B/L 2 x 10  CKC Rotation on plinth 2 x 10 B/L (B/L).   Manual Therapy:  STM to Left supraspinatus, UT, LS(X)  KT Tape for scap retraction/depression, AC joint space correction to L GH joint   IASTM 10' (X)  TDN:  60mm to B/L UT  40mm to periscapular musculature B/L.         OP EDUCATION:  Education  Individual(s) Educated: Patient  Education Provided: Body Mechanics, Home Exercise Program, POC  Risk and Benefits Discussed with Patient/Caregiver/Other: yes  Patient/Caregiver Demonstrated Understanding: yes  Plan of Care Discussed and Agreed Upon: yes  Patient Response to Education: Patient/Caregiver Verbalized Understanding of Information    Access Code: [email or web address]  URL: [email or web address]  Date: 10/16/2023  Prepared by: Avelina Dunham    Exercises  - Seated Gluteal Sets  - 1 x daily - 7 x weekly - 1 sets - 10 reps - 5 hold  - Seated Hip Abduction with Pelvic Floor Contraction and Resistance Loop  - 1 x daily - 7 x weekly - 1 sets - 10 reps - 5 hold  - Seated Isometric Hip Adduction with " "Pelvic Floor Contraction  - 1 x daily - 7 x weekly - 1 sets - 10 reps - 5 hold  - Seated March with Resistance  - 1 x daily - 7 x weekly - 1 sets - 10 reps - 5 hold  Goals:  Active       PT Problem       PT Goal 1 (Progressing)       Start:  10/09/23    Expected End:  12/05/23       By discharge MY BENDER will achieve the following goals:      Pt will demo and report compliance with HEP in order to augment POC goals and progression toward independence with symptom management for better outcomes once D/C from POC. MET     Strength: Pt will demo improved MMT by >/= 1 point on 0-5 point scale in BUE's for improved strength and stability, and improved ease with lifting, carrying, and proper mechanics with ADL's & IADL's.  MET 10/16       QUICKDASH, Pt will report subjective improvement with score on QUICKDASH improved by >/= 5 points for return to PLOF, improved QOL, and improved ease with ADL's & IADL\"s.      , goal partially met      New POC for Lumbar spine 10/16/23:    Pt will demo improved MMT by >/= 1 point on 0-5 point scale in BLE for improved strength and stability, and improved ease with transfers, lifting/carrying, and proper mechanics with ADL's & IADL's.     Pt will report subjective improvement with score on TAMI improved by >/= 5 points for return to PLOF, improved QOL, and improved ease with ADL's & IADL\"s.     Pt will report improved ease and decreased pain to </= 3/10 with sit<>stand transfers demonstrating improved body mechanics and TrA activation to decrease strain to lumbar spine with transfers throughout day.                      "

## 2023-10-16 NOTE — LETTER
October 16, 2023    Avelina Dunham, PT  [address] [facility] [address] [address]    Patient: Aaron Bartholomew Jr.   YOB: 1970   Date of Visit: 10/16/2023       Dear No referring provider defined for this encounter.    The attached plan of care is being sent to you because your patient’s medical reimbursement requires that you certify the plan of care. Your signature is required to allow uninterrupted insurance coverage.      You may indicate your approval by signing below and faxing this form back to us at Dept Fax: [phone].    Please call Dept: [phone] with any questions or concerns.    Thank you for this referral,        Avelina Dunham PT  [facility] [address] [facility]  [address] [address]    Payer: Payor: MEDICARE / Plan: MEDICARE PART A AND B / Product Type: *No Product type* /                                                                         Date:     Dear Avelina Dunham PT,     Re: Mr. Aaron Bartholomew, MRN:[id number]    I certify that I have reviewed the attached plan of care and it is medically necessary for Mr. Aaron Bartholomew (1970) who is under my care.          ______________________________________                    _________________  Provider name and credentials                                           Date and time                                                                                           Plan of Care 10/16/23   Effective from: 10/16/2023  Effective to: 11/15/2023    Plan ID: [id number]            Participants as of Finalize on 10/16/2023    Name Type Comments Contact Info    Avelina Dunham PT Physical Therapist  [phone]    Jaylin Hudson APRN-CNP Consulting Physician  [phone]       Last Plan Note     Author: Avelina Dunham PT Status: Incomplete Last edited: 10/16/2023  2:30 PM       Physical Therapy    Physical Therapy Lumbar Spine Evaluation    Patient Name: Aaron TATE  [place] Con[place]  MRN: [id number]  Today's Date: 10/16/2023  Time Calculation  Start Time: 1435  Stop Time: 1520  Time Calculation (min): 45 min    Current Problem  Problem List Items Addressed This Visit             ICD-10-CM    Lumbago - Primary M54.50    Chronic left shoulder pain M25.512, G89.29    Arthritis of left shoulder region M19.012    Arthritis of right shoulder region M19.011    Impingement syndrome, shoulder, left M75.42    Other synovitis and tenosynovitis, right shoulder M65.811    Chronic right shoulder pain M25.511, G89.29    Degeneration of lumbar intervertebral disc M51.36          Assessment:  Re-eval performed this date due to pt presenting with additional scrip to add lumbar spine to POC. Due to good progress made on Shoulder POC, focused on lumbar region this date with pt presenting with good ROM and flexibility, however demonstrates poor stability in lumbosacral region. IS functionally limited with all transfers and prolonged standing, as well as lifting/carrying for ADL's and work. Pt with poor TrA activation, especially with mobility. Responded well to STM and TDN this date with decreased pain following session. Pt reported good understanding of edu on body mechanics and posture, as well as symptom management with use of cryotherapy at end of day to address inflammation. Verbally reviewed HEP HO with focus on seated TrA, Pelvic floor, and Glute retraining and stability exercises with small ranges and focus on isometric holds.     PT Assessment Results: Decreased strength, Decreased mobility, Pain  Rehab Prognosis: Good  Evaluation/Treatment Tolerance: Patient tolerated treatment well  Barriers to Participation: Comorbidities    Plan:  Plan to continue with manual therapy, including dry needling per script from MD, as well as focus on progression of core and glute stabilization to improve ease with transfer and ADL's/IADL's.     Treatment/Interventions: Cryotherapy, Dry needling, Education/  Instruction, Manual therapy, Neuromuscular re-education, Self care/ home management, Therapeutic activities, Therapeutic exercises, Other (comment) (Unique/IASTM)  PT Plan: Skilled PT  PT Frequency: 2 times per week  Duration: 4 weeks  Rehab Potential: Good  Plan of Care Agreement: Patient    Subjective   General  Reason for Referral: LBP  Pt presented this date to continue POC for shoulders, but with new script to add lumbar spine. Chronic lumbago but with acute exacerbation last week. Does get radiating pain to posterior thighs at times. Does get muscle cramps.   Precautions  Precautions  STEADI Fall Risk Score (The score of 4 or more indicates an increased risk of falling): 0  Precautions Comment: Fall risk: None; Cancer treatment but cleared for manual and TDN       Pain:  Pain Assessment  Pain Assessment: 0-10  Pain Score: 4  Pain Type: Chronic pain  Pain Location: Back  Pain Orientation: Lower, Right, Left  Pain Radiating Towards: posterior thighs, typically  Pain Descriptors: Aching, Cramping, Radiating, Sharp, Shooting, Pins and needles  Pain Frequency: Constant/continuous  Home Living:     Prior Level of Function:  Prior Function Per Pt/Caregiver Report  Level of [place]: Independent with ADLs and functional transfersindependent    Objective  OBJECTIVE:    Lumbar ROM:  Date: eval Percentage    Flexion 90%    Extension 100%     RIGHT LEFT   Side bend 100% 100%   Rotation 100% 100%     Lower Extremity Strength:  MMT 5/5 max  RIGHT LEFT   Hip Flexion 4 5   Hip Extension N/T N/T   Hip Abduction Seated 4-/5 Seated 4/5   Hip Adduction Seated 4-/5 Seated 4/5   Knee Extension 4/5 4/5   Knee Flexion 4/5 4/5   Ankle DF 4+/5 4+/5   Ankle PF N/T N/T   Ankle INV N/T N/T   Ankle EV N/T N/T     Joint mobility deferred  Palpation: Myofascial restrictions throughout bilateral hip flexors, TFL, Glutes, QL, and lumbar paraspinals    TREATMENT:  Lumbar POC Treatment  Ther-ex:  Attempted UBE with BUE/BLEs but stopped BLE  "after few minutes  NUStep (A)  Hooklying TrA with alternated resisted Hip ABD ISO (A)  Seated TrA LAQ (A)  Seated TrA Row (A)  Verbal review of HEP HO; Pt edu on body mechanics for transfers as well as symptom management    Manual Therapy:  STM hip flexor B/L (N)  TDN: 100mm R QL and Glutes (N)  Passive hip flexor stretch (A)    Shoulder POC Treatment:  On hold starting 10/16/Resume if necessary:  Therapeutic Exercise:   scap retraction x 10 x5\" (X, not today)  UBE 3' fwd 3' bwd   Pulley 3' flex10 (X, not today)  Resisted rows Pink tube 2 x 10 (X, not today)  Resisted extension Pink tube 2 x 10 (X, not today)   Ball on wall 2 x 10   - up/down  - H ADD,H ABD  - CW/CCW   BOSU side to side 2x10 (N)  BOSU CW/CCW 2x10 (N)  Supine Rhythx andreina Stabilization at 90 shld flex 3 x 20\" (X, not today)   CKC Weight Shifts B/L 2 x 10  CKC Rotation on plinth 2 x 10 B/L (B/L).   Manual Therapy:  STM to Left supraspinatus, UT, LS(X)  KT Tape for scap retraction/depression, AC joint space correction to L GH joint   IASTM 10' (X)  TDN:  60mm to B/L UT  40mm to periscapular musculature B/L.         OP EDUCATION:  Education  Individual(s) Educated: Patient  Education Provided: Body Mechanics, Home Exercise Program, POC  Risk and Benefits Discussed with Patient/Caregiver/Other: yes  Patient/Caregiver Demonstrated Understanding: yes  Plan of Care Discussed and Agreed Upon: yes  Patient Response to Education: Patient/Caregiver Verbalized Understanding of Information    Access Code: [email or web address]  URL: [email or web address]  Date: 10/16/2023  Prepared by: Avelina Dunham    Exercises  - Seated Gluteal Sets  - 1 x daily - 7 x weekly - 1 sets - 10 reps - 5 hold  - Seated Hip Abduction with Pelvic Floor Contraction and Resistance Loop  - 1 x daily - 7 x weekly - 1 sets - 10 reps - 5 hold  - Seated Isometric Hip Adduction with Pelvic Floor Contraction  - 1 x daily - 7 x weekly - 1 sets - 10 reps - 5 hold  - Seated March with " "Resistance  - 1 x daily - 7 x weekly - 1 sets - 10 reps - 5 hold  Goals:  Active       PT Problem       PT Goal 1 (Progressing)       Start:  10/09/23    Expected End:  12/05/23       By discharge MY BENDER will achieve the following goals:      Pt will demo and report compliance with HEP in order to augment POC goals and progression toward independence with symptom management for better outcomes once D/C from POC. MET     Strength: Pt will demo improved MMT by >/= 1 point on 0-5 point scale in BUE's for improved strength and stability, and improved ease with lifting, carrying, and proper mechanics with ADL's & IADL's.  MET 10/16       QUICKDASH, Pt will report subjective improvement with score on QUICKDASH improved by >/= 5 points for return to PLOF, improved QOL, and improved ease with ADL's & IADL\"s.      , goal partially met      New POC for Lumbar spine 10/16/23:    Pt will demo improved MMT by >/= 1 point on 0-5 point scale in BLE for improved strength and stability, and improved ease with transfers, lifting/carrying, and proper mechanics with ADL's & IADL's.     Pt will report subjective improvement with score on TAMI improved by >/= 5 points for return to PLOF, improved QOL, and improved ease with ADL's & IADL\"s.     Pt will report improved ease and decreased pain to </= 3/10 with sit<>stand transfers demonstrating improved body mechanics and TrA activation to decrease strain to lumbar spine with transfers throughout day.                             Current Participants as of 10/16/2023    Name Type Comments Contact Info    Avelina Dunham, JAKUB Physical Therapist  [phone]    Signature pending    KANNAN Merida-CNP Consulting Physician  [phone]    Signature pending      "

## 2023-10-16 NOTE — LETTER
October 16, 2023    ROD Merida  [address]  [address] [address]  [facility] [address]    Patient: Aaron Bartholomew Jr.   YOB: 1970   Date of Visit: 10/16/2023       Dear No referring provider defined for this encounter.    The attached plan of care is being sent to you because your patient’s medical reimbursement requires that you certify the plan of care. Your signature is required to allow uninterrupted insurance coverage.      You may indicate your approval by signing below and faxing this form back to us at Dept Fax: [phone].    Please call Dept: [phone] with any questions or concerns.    Thank you for this referral,        Avelina Dunham PT  [facility] [address] [facility]  [address] [facility] [address]    Payer: Payor: MEDICARE / Plan: MEDICARE PART A AND B / Product Type: *No Product type* /                                                                         Date:     Dear Avelina Dunham PT,     Re: Mr. Aaron Bartholomew, MRN:[id number]    I certify that I have reviewed the attached plan of care and it is medically necessary for Mr. Aaron Bartholomew (1970) who is under my care.          ______________________________________                    _________________  Provider name and credentials                                           Date and time                                                                                           Plan of Care 10/16/23   Effective from: 10/16/2023  Effective to: 11/15/2023    Plan ID: [id number]            Participants as of Finalize on 10/16/2023    Name Type Comments Contact Info    Avelina Dunham PT Physical Therapist  [phone]    ROD Merida Consulting Physician  [phone]       Last Plan Note     Author: Avelina Dunham PT Status: Incomplete Last edited: 10/16/2023  2:30 PM       Physical Therapy    Physical Therapy Lumbar Spine Evaluation    Patient Name: Aaron TATE  [place] Conner  MRN: [id number]  Today's Date: 10/16/2023  Time Calculation  Start Time: 1435  Stop Time: 1520  Time Calculation (min): 45 min    Current Problem  Problem List Items Addressed This Visit             ICD-10-CM    Lumbago - Primary M54.50    Chronic left shoulder pain M25.512, G89.29    Arthritis of left shoulder region M19.012    Arthritis of right shoulder region M19.011    Impingement syndrome, shoulder, left M75.42    Other synovitis and tenosynovitis, right shoulder M65.811    Chronic right shoulder pain M25.511, G89.29    Degeneration of lumbar intervertebral disc M51.36          Assessment:  Re-eval performed this date due to pt presenting with additional scrip to add lumbar spine to POC. Due to good progress made on Shoulder POC, focused on lumbar region this date with pt presenting with good ROM and flexibility, however demonstrates poor stability in lumbosacral region. IS functionally limited with all transfers and prolonged standing, as well as lifting/carrying for ADL's and work. Pt with poor TrA activation, especially with mobility. Responded well to STM and TDN this date with decreased pain following session. Pt reported good understanding of edu on body mechanics and posture, as well as symptom management with use of cryotherapy at end of day to address inflammation. Verbally reviewed HEP HO with focus on seated TrA, Pelvic floor, and Glute retraining and stability exercises with small ranges and focus on isometric holds.     PT Assessment Results: Decreased strength, Decreased mobility, Pain  Rehab Prognosis: Good  Evaluation/Treatment Tolerance: Patient tolerated treatment well  Barriers to Participation: Comorbidities    Plan:  Plan to continue with manual therapy, including dry needling per script from MD, as well as focus on progression of core and glute stabilization to improve ease with transfer and ADL's/IADL's.     Treatment/Interventions: Cryotherapy, Dry needling, Education/  Instruction, Manual therapy, Neuromuscular re-education, Self care/ home management, Therapeutic activities, Therapeutic exercises, Other (comment) (Unique/IASTM)  PT Plan: Skilled PT  PT Frequency: 2 times per week  Duration: 4 weeks  Rehab Potential: Good  Plan of Care Agreement: Patient    Subjective   General  Reason for Referral: LBP  Pt presented this date to continue POC for shoulders, but with new script to add lumbar spine. Chronic lumbago but with acute exacerbation last week. Does get radiating pain to posterior thighs at times. Does get muscle cramps.   Precautions  Precautions  STEADI Fall Risk Score (The score of 4 or more indicates an increased risk of falling): 0  Precautions Comment: Fall risk: None; Cancer treatment but cleared for manual and TDN       Pain:  Pain Assessment  Pain Assessment: 0-10  Pain Score: 4  Pain Type: Chronic pain  Pain Location: Back  Pain Orientation: Lower, Right, Left  Pain Radiating Towards: posterior thighs, typically  Pain Descriptors: Aching, Cramping, Radiating, Sharp, Shooting, Pins and needles  Pain Frequency: Constant/continuous  Home Living:     Prior Level of Function:  Prior Function Per Pt/Caregiver Report  Level of [place]: Independent with ADLs and functional transfersindependent    Objective  OBJECTIVE:    Lumbar ROM:  Date: eval Percentage    Flexion 90%    Extension 100%     RIGHT LEFT   Side bend 100% 100%   Rotation 100% 100%     Lower Extremity Strength:  MMT 5/5 max  RIGHT LEFT   Hip Flexion 4 5   Hip Extension N/T N/T   Hip Abduction Seated 4-/5 Seated 4/5   Hip Adduction Seated 4-/5 Seated 4/5   Knee Extension 4/5 4/5   Knee Flexion 4/5 4/5   Ankle DF 4+/5 4+/5   Ankle PF N/T N/T   Ankle INV N/T N/T   Ankle EV N/T N/T     Joint mobility deferred  Palpation: Myofascial restrictions throughout bilateral hip flexors, TFL, Glutes, QL, and lumbar paraspinals    TREATMENT:  Lumbar POC Treatment  Ther-ex:  Attempted UBE with BUE/BLEs but stopped BLE  "after few minutes  NUStep (A)  Hooklying TrA with alternated resisted Hip ABD ISO (A)  Seated TrA LAQ (A)  Seated TrA Row (A)  Verbal review of HEP HO; Pt edu on body mechanics for transfers as well as symptom management    Manual Therapy:  STM hip flexor B/L (N)  TDN: 100mm R QL and Glutes (N)  Passive hip flexor stretch (A)    Shoulder POC Treatment:  On hold starting 10/16/Resume if necessary:  Therapeutic Exercise:   scap retraction x 10 x5\" (X, not today)  UBE 3' fwd 3' bwd   Pulley 3' flex10 (X, not today)  Resisted rows Pink tube 2 x 10 (X, not today)  Resisted extension Pink tube 2 x 10 (X, not today)   Ball on wall 2 x 10   - up/down  - H ADD,H ABD  - CW/CCW   BOSU side to side 2x10 (N)  BOSU CW/CCW 2x10 (N)  Supine Rhythx andreina Stabilization at 90 shld flex 3 x 20\" (X, not today)   CKC Weight Shifts B/L 2 x 10  CKC Rotation on plinth 2 x 10 B/L (B/L).   Manual Therapy:  STM to Left supraspinatus, UT, LS(X)  KT Tape for scap retraction/depression, AC joint space correction to L GH joint   IASTM 10' (X)  TDN:  60mm to B/L UT  40mm to periscapular musculature B/L.         OP EDUCATION:  Education  Individual(s) Educated: Patient  Education Provided: Body Mechanics, Home Exercise Program, POC  Risk and Benefits Discussed with Patient/Caregiver/Other: yes  Patient/Caregiver Demonstrated Understanding: yes  Plan of Care Discussed and Agreed Upon: yes  Patient Response to Education: Patient/Caregiver Verbalized Understanding of Information    Access Code: [email or web address]  URL: [email or web address]  Date: 10/16/2023  Prepared by: Avelina Dunham    Exercises  - Seated Gluteal Sets  - 1 x daily - 7 x weekly - 1 sets - 10 reps - 5 hold  - Seated Hip Abduction with Pelvic Floor Contraction and Resistance Loop  - 1 x daily - 7 x weekly - 1 sets - 10 reps - 5 hold  - Seated Isometric Hip Adduction with Pelvic Floor Contraction  - 1 x daily - 7 x weekly - 1 sets - 10 reps - 5 hold  - Seated March with " "Resistance  - 1 x daily - 7 x weekly - 1 sets - 10 reps - 5 hold  Goals:  Active       PT Problem       PT Goal 1 (Progressing)       Start:  10/09/23    Expected End:  12/05/23       By discharge MY BENDER will achieve the following goals:      Pt will demo and report compliance with HEP in order to augment POC goals and progression toward independence with symptom management for better outcomes once D/C from POC. MET     Strength: Pt will demo improved MMT by >/= 1 point on 0-5 point scale in BUE's for improved strength and stability, and improved ease with lifting, carrying, and proper mechanics with ADL's & IADL's.  MET 10/16       QUICKDASH, Pt will report subjective improvement with score on QUICKDASH improved by >/= 5 points for return to PLOF, improved QOL, and improved ease with ADL's & IADL\"s.      , goal partially met      New POC for Lumbar spine 10/16/23:    Pt will demo improved MMT by >/= 1 point on 0-5 point scale in BLE for improved strength and stability, and improved ease with transfers, lifting/carrying, and proper mechanics with ADL's & IADL's.     Pt will report subjective improvement with score on TAMI improved by >/= 5 points for return to PLOF, improved QOL, and improved ease with ADL's & IADL\"s.     Pt will report improved ease and decreased pain to </= 3/10 with sit<>stand transfers demonstrating improved body mechanics and TrA activation to decrease strain to lumbar spine with transfers throughout day.                             Current Participants as of 10/16/2023    Name Type Comments Contact Info    Avelina Dunham, JAKUB Physical Therapist  [phone]    Signature pending    KANNAN Merida-CNP Consulting Physician  [phone]    Signature pending      "

## 2023-10-23 ENCOUNTER — TREATMENT (OUTPATIENT)
Dept: PHYSICAL THERAPY | Facility: CLINIC | Age: 53
End: 2023-10-23
Payer: MEDICARE

## 2023-10-23 DIAGNOSIS — M25.511 CHRONIC RIGHT SHOULDER PAIN: ICD-10-CM

## 2023-10-23 DIAGNOSIS — M19.011 ARTHRITIS OF RIGHT SHOULDER REGION: ICD-10-CM

## 2023-10-23 DIAGNOSIS — M54.50 LUMBAGO: Primary | ICD-10-CM

## 2023-10-23 DIAGNOSIS — G89.29 CHRONIC LEFT SHOULDER PAIN: ICD-10-CM

## 2023-10-23 DIAGNOSIS — M75.42 IMPINGEMENT SYNDROME, SHOULDER, LEFT: ICD-10-CM

## 2023-10-23 DIAGNOSIS — M51.36 DEGENERATION OF LUMBAR INTERVERTEBRAL DISC: ICD-10-CM

## 2023-10-23 DIAGNOSIS — M25.512 CHRONIC LEFT SHOULDER PAIN: ICD-10-CM

## 2023-10-23 DIAGNOSIS — M65.811 OTHER SYNOVITIS AND TENOSYNOVITIS, RIGHT SHOULDER: ICD-10-CM

## 2023-10-23 DIAGNOSIS — G89.29 CHRONIC RIGHT SHOULDER PAIN: ICD-10-CM

## 2023-10-23 DIAGNOSIS — M19.012 ARTHRITIS OF LEFT SHOULDER REGION: ICD-10-CM

## 2023-10-23 PROCEDURE — 97140 MANUAL THERAPY 1/> REGIONS: CPT | Mod: GP,CQ

## 2023-10-23 PROCEDURE — 97110 THERAPEUTIC EXERCISES: CPT | Mod: GP,CQ

## 2023-10-23 ASSESSMENT — PAIN - FUNCTIONAL ASSESSMENT: PAIN_FUNCTIONAL_ASSESSMENT: 0-10

## 2023-10-23 ASSESSMENT — PAIN SCALES - GENERAL: PAINLEVEL_OUTOF10: 6

## 2023-10-23 ASSESSMENT — PAIN DESCRIPTION - DESCRIPTORS: DESCRIPTORS: ACHING;CRAMPING;NUMBNESS

## 2023-10-23 NOTE — PROGRESS NOTES
Physical Therapy Treatment    Patient Name: Aaron Bartholomew Jr.  MRN: [id number]  Today's Date: 10/23/2023  Time Calculation  Start Time: 1125  Stop Time: 1215  Time Calculation (min): 50 min      Assessment:   Patient identified by name and date of birth.   Patient able to complete Nustep this visit.   Exacerbation of R LB symptoms with seated LAQ with R knee ext. No exacerbation with L knee ext.   Tightness in R QL.   Mod A to assist with S/L to sit transfers.   TDN performed this date at the end of the session by Avelina Dunham PT, DTP Cert DN without any adverse reactions.     Plan:  OP PT Plan  Treatment/Interventions: Cryotherapy, Dry needling, Education/ Instruction, Manual therapy, Neuromuscular re-education, Self care/ home management, Therapeutic activities, Therapeutic exercises, Other (comment) (Unique/VALENCIA)  PT Plan: Skilled PT  PT Frequency: 2 times per week  Duration: 4 weeks  Rehab Potential: Good  Plan of Care Agreement: Patient    Plan continue with DLS for continued core strength progression for improved stability. Will progress HEP next date for continued progression.  JA    Current Problem  Problem List Items Addressed This Visit             ICD-10-CM       Musculoskeletal and Injuries    Lumbago - Primary M54.50    Chronic left shoulder pain M25.512, G89.29    Arthritis of left shoulder region M19.012    Arthritis of right shoulder region M19.011    Impingement syndrome, shoulder, left M75.42    Other synovitis and tenosynovitis, right shoulder M65.811    Chronic right shoulder pain M25.511, G89.29       Neuro    Degeneration of lumbar intervertebral disc M51.36       Subjective    Patient reports that he is sore today.   Notes that he was working on his car and has difficulty with getting up off the ground.     Precautions  Precautions  Precautions Comment: Fall risk: None; Cancer treatment but cleared for manual and TDN    Pain  Pain Assessment: 0-10  Pain Score: 6  Pain Type: Chronic  "pain  Pain Location: Back  Pain Orientation: Lower, Right, Left  Pain Radiating Towards: More centralized to spine  Pain Descriptors: Aching, Cramping, Numbness  Pain Frequency: Constant/continuous    Treatments:   Lumbar POC Treatment  Ther-ex:  NUStep 5' (N)  Hooklying TrA with alternated resisted Hip ABD ISO Blue band 3\" hold 2 x 10  (N)  Hooklying glute squeezes 3\" 2 x 10 (N)   Hooklying hip ADD 2 x 10 (N)   Hooklying TrA 2 x 5  3\" each  (N)   Seated TrA LAQ 2 x 10 (N)  Seated TrA Row purple tube 2 x 10 (N)        Manual Therapy:  STM hip flexor B/L   TDN: 100mm R QL and Glutes   Passive hip flexor stretch (A)     Not Completed:  Attempted UBE with BUE/BLEs but stopped BLE after few minutes (X)     OP EDUCATION:  10/23/23 - Reviewed HEP this date that was given at last visit. JA     Access Code: [email or web address]  URL: [email or web address]  Date: 10/16/2023  Prepared by: Avelina Dunham     Exercises  - Seated Gluteal Sets  - 1 x daily - 7 x weekly - 1 sets - 10 reps - 5 hold  - Seated Hip Abduction with Pelvic Floor Contraction and Resistance Loop  - 1 x daily - 7 x weekly - 1 sets - 10 reps - 5 hold  - Seated Isometric Hip Adduction with Pelvic Floor Contraction  - 1 x daily - 7 x weekly - 1 sets - 10 reps - 5 hold  - Seated March with Resistance  - 1 x daily - 7 x weekly - 1 sets - 10 reps - 5 hold    Goals:  Active       PT Problem       PT Goal 1 (Progressing)       Start:  10/09/23    Expected End:  12/05/23       By discharge MY BENDER will achieve the following goals:      Pt will demo and report compliance with HEP in order to augment POC goals and progression toward independence with symptom management for better outcomes once D/C from POC. MET     Strength: Pt will demo improved MMT by >/= 1 point on 0-5 point scale in BUE's for improved strength and stability, and improved ease with lifting, carrying, and proper mechanics with ADL's & IADL's.  MET 10/16       QUICKDASH, Pt will " "report subjective improvement with score on QUICKDASH improved by >/= 5 points for return to PLOF, improved QOL, and improved ease with ADL's & IADL\"s.      , goal partially met      New POC for Lumbar spine 10/16/23:    Pt will demo improved MMT by >/= 1 point on 0-5 point scale in BLE for improved strength and stability, and improved ease with transfers, lifting/carrying, and proper mechanics with ADL's & IADL's.     Pt will report subjective improvement with score on TAMI improved by >/= 5 points for return to PLOF, improved QOL, and improved ease with ADL's & IADL\"s.     Pt will report improved ease and decreased pain to </= 3/10 with sit<>stand transfers demonstrating improved body mechanics and TrA activation to decrease strain to lumbar spine with transfers throughout day.                "

## 2023-10-30 ENCOUNTER — APPOINTMENT (OUTPATIENT)
Dept: PHYSICAL THERAPY | Facility: CLINIC | Age: 53
End: 2023-10-30
Payer: MEDICARE

## 2023-10-30 ENCOUNTER — DOCUMENTATION (OUTPATIENT)
Dept: PHYSICAL THERAPY | Facility: CLINIC | Age: 53
End: 2023-10-30
Payer: MEDICARE

## 2023-10-30 NOTE — PROGRESS NOTES
Physical Therapy                 Therapy Communication Note    Patient Name: Aaron Bartholomew Jr.  MRN: [id number]  Today's Date: 10/30/2023     Discipline: Physical Therapy    Missed Visit Reason:  Called pt concerning no show to his recheck this date and LM for pt to call back and reschedule appointment. Pt called the  back and let them know he cancelled via [employer].     If pt does not call back within 30 days to reschedule appointment, this will serve as patients discharge note for this PT POC. After 30 days pt will require new PT order from MD to initiate therapy.     Missed Time: Cancelled via [address]

## 2024-05-20 ENCOUNTER — HOSPITAL ENCOUNTER (EMERGENCY)
Age: 54
Discharge: HOME | End: 2024-05-20
Payer: MEDICARE

## 2024-05-20 VITALS
OXYGEN SATURATION: 93 % | DIASTOLIC BLOOD PRESSURE: 90 MMHG | HEART RATE: 87 BPM | RESPIRATION RATE: 16 BRPM | SYSTOLIC BLOOD PRESSURE: 134 MMHG

## 2024-05-20 VITALS
SYSTOLIC BLOOD PRESSURE: 126 MMHG | OXYGEN SATURATION: 91 % | HEART RATE: 83 BPM | DIASTOLIC BLOOD PRESSURE: 67 MMHG | RESPIRATION RATE: 16 BRPM

## 2024-05-20 VITALS
HEART RATE: 82 BPM | SYSTOLIC BLOOD PRESSURE: 120 MMHG | DIASTOLIC BLOOD PRESSURE: 83 MMHG | OXYGEN SATURATION: 92 % | RESPIRATION RATE: 18 BRPM | TEMPERATURE: 98.2 F

## 2024-05-20 VITALS
RESPIRATION RATE: 22 BRPM | HEART RATE: 92 BPM | OXYGEN SATURATION: 93 % | TEMPERATURE: 96.9 F | SYSTOLIC BLOOD PRESSURE: 151 MMHG | DIASTOLIC BLOOD PRESSURE: 94 MMHG

## 2024-05-20 VITALS
RESPIRATION RATE: 18 BRPM | OXYGEN SATURATION: 93 % | SYSTOLIC BLOOD PRESSURE: 117 MMHG | DIASTOLIC BLOOD PRESSURE: 75 MMHG | HEART RATE: 80 BPM

## 2024-05-20 DIAGNOSIS — Z79.899: ICD-10-CM

## 2024-05-20 DIAGNOSIS — Z86.711: ICD-10-CM

## 2024-05-20 DIAGNOSIS — Z79.01: ICD-10-CM

## 2024-05-20 DIAGNOSIS — G47.33: ICD-10-CM

## 2024-05-20 DIAGNOSIS — Z86.718: ICD-10-CM

## 2024-05-20 DIAGNOSIS — E11.65: Primary | ICD-10-CM

## 2024-05-20 DIAGNOSIS — F17.210: ICD-10-CM

## 2024-05-20 DIAGNOSIS — Z79.51: ICD-10-CM

## 2024-05-20 LAB
ANION GAP: 5 (ref 5–15)
BUN SERPL-MCNC: 10 MG/DL (ref 7–18)
BUN/CREAT RATIO: 8.6 RATIO (ref 10–20)
CALCIUM SERPL-MCNC: 8.9 MG/DL (ref 8.5–10.1)
CARBON DIOXIDE: 26 MMOL/L (ref 21–32)
CHLORIDE: 99 MMOL/L (ref 98–107)
DEPRECATED RDW RBC: 43.1 FL (ref 35.1–43.9)
ERYTHROCYTE [DISTWIDTH] IN BLOOD: 13.3 % (ref 11.6–14.6)
EST GLOM FILT RATE - AFR AMER: 84 ML/MIN (ref 60–?)
GLUCOSE: 347 MG/DL (ref 74–106)
HCT VFR BLD AUTO: 43.5 % (ref 40–54)
HGB BLD-MCNC: 15.1 G/DL (ref 13–16.5)
IMMATURE GRANULOCYTES COUNT: 0.04 X10^3/UL (ref 0–0)
MCV RBC: 90.2 FL (ref 80–94)
MEAN CORP HGB CONC: 34.7 G/DL (ref 32–36)
MEAN PLATELET VOL.: 11.4 FL (ref 6.2–12)
NRBC FLAGGED BY ANALYZER: 0 % (ref 0–5)
PLATELET # BLD: 146 K/MM3 (ref 150–450)
POTASSIUM: 4.3 MMOL/L (ref 3.5–5.1)
RBC # BLD AUTO: 4.82 M/MM3 (ref 4.6–6.2)
WBC # BLD AUTO: 9 K/MM3 (ref 4.4–11)

## 2024-05-20 PROCEDURE — 96360 HYDRATION IV INFUSION INIT: CPT

## 2024-05-20 PROCEDURE — 85025 COMPLETE CBC W/AUTO DIFF WBC: CPT

## 2024-05-20 PROCEDURE — 82962 GLUCOSE BLOOD TEST: CPT

## 2024-05-20 PROCEDURE — 80048 BASIC METABOLIC PNL TOTAL CA: CPT

## 2024-05-20 PROCEDURE — 82009 KETONE BODYS QUAL: CPT

## 2024-05-20 PROCEDURE — 96361 HYDRATE IV INFUSION ADD-ON: CPT

## 2024-05-20 PROCEDURE — A4216 STERILE WATER/SALINE, 10 ML: HCPCS

## 2024-05-20 PROCEDURE — 99284 EMERGENCY DEPT VISIT MOD MDM: CPT

## 2024-05-22 ENCOUNTER — HOSPITAL ENCOUNTER (OUTPATIENT)
Dept: HOSPITAL 100 - CT | Age: 54
Discharge: HOME | End: 2024-05-22
Payer: MEDICARE

## 2024-05-22 DIAGNOSIS — Z12.2: Primary | ICD-10-CM

## 2024-05-22 DIAGNOSIS — F17.210: ICD-10-CM

## 2024-05-22 PROCEDURE — 71271 CT THORAX LUNG CANCER SCR C-: CPT

## 2024-08-05 ENCOUNTER — HOSPITAL ENCOUNTER (OUTPATIENT)
Dept: HOSPITAL 100 - SDC | Age: 54
Discharge: HOME | End: 2024-08-05
Payer: MEDICARE

## 2024-08-05 VITALS
OXYGEN SATURATION: 96 % | TEMPERATURE: 96.9 F | SYSTOLIC BLOOD PRESSURE: 143 MMHG | DIASTOLIC BLOOD PRESSURE: 94 MMHG | RESPIRATION RATE: 16 BRPM | HEART RATE: 95 BPM

## 2024-08-05 VITALS
TEMPERATURE: 97.6 F | SYSTOLIC BLOOD PRESSURE: 143 MMHG | DIASTOLIC BLOOD PRESSURE: 94 MMHG | RESPIRATION RATE: 14 BRPM | HEART RATE: 89 BPM | OXYGEN SATURATION: 95 %

## 2024-08-05 VITALS — BODY MASS INDEX: 54.9 KG/M2

## 2024-08-05 DIAGNOSIS — Z79.84: ICD-10-CM

## 2024-08-05 DIAGNOSIS — F17.210: ICD-10-CM

## 2024-08-05 DIAGNOSIS — Z79.51: ICD-10-CM

## 2024-08-05 DIAGNOSIS — I10: ICD-10-CM

## 2024-08-05 DIAGNOSIS — M51.17: Primary | ICD-10-CM

## 2024-08-05 DIAGNOSIS — M48.07: ICD-10-CM

## 2024-08-05 DIAGNOSIS — Z79.01: ICD-10-CM

## 2024-08-05 DIAGNOSIS — Z79.899: ICD-10-CM

## 2024-08-05 DIAGNOSIS — Z79.4: ICD-10-CM

## 2024-08-05 PROCEDURE — 3E0S3BZ INTRODUCTION OF ANESTHETIC AGENT INTO EPIDURAL SPACE, PERCUTANEOUS APPROACH: ICD-10-PCS | Performed by: ANESTHESIOLOGY

## 2024-08-05 PROCEDURE — 82962 GLUCOSE BLOOD TEST: CPT

## 2024-08-05 PROCEDURE — 77003 FLUOROGUIDE FOR SPINE INJECT: CPT

## 2024-08-05 PROCEDURE — 64483 NJX AA&/STRD TFRM EPI L/S 1: CPT

## 2024-08-05 PROCEDURE — 62323 NJX INTERLAMINAR LMBR/SAC: CPT

## 2024-08-05 RX ADMIN — LIDOCAINE HYDROCHLORIDE 5 ML: 10 SOLUTION INTRAVENOUS at 10:22

## 2024-08-05 RX ADMIN — SODIUM CHLORIDE 10 ML: 9 INJECTION, SOLUTION INTRAMUSCULAR; INTRAVENOUS; SUBCUTANEOUS at 10:23

## 2024-10-21 ENCOUNTER — HOSPITAL ENCOUNTER (OUTPATIENT)
Dept: HOSPITAL 100 - SDC | Age: 54
Discharge: HOME | End: 2024-10-21
Payer: MEDICARE

## 2024-10-21 VITALS
SYSTOLIC BLOOD PRESSURE: 133 MMHG | OXYGEN SATURATION: 96 % | RESPIRATION RATE: 18 BRPM | DIASTOLIC BLOOD PRESSURE: 80 MMHG | TEMPERATURE: 97.88 F | HEART RATE: 86 BPM

## 2024-10-21 VITALS — OXYGEN SATURATION: 96 % | DIASTOLIC BLOOD PRESSURE: 96 MMHG | SYSTOLIC BLOOD PRESSURE: 145 MMHG

## 2024-10-21 VITALS
SYSTOLIC BLOOD PRESSURE: 133 MMHG | TEMPERATURE: 97.52 F | RESPIRATION RATE: 20 BRPM | DIASTOLIC BLOOD PRESSURE: 80 MMHG | HEART RATE: 84 BPM | OXYGEN SATURATION: 95 %

## 2024-10-21 VITALS — BODY MASS INDEX: 55 KG/M2

## 2024-10-21 DIAGNOSIS — M54.16: ICD-10-CM

## 2024-10-21 DIAGNOSIS — L40.9: ICD-10-CM

## 2024-10-21 DIAGNOSIS — M51.379: ICD-10-CM

## 2024-10-21 DIAGNOSIS — M48.061: ICD-10-CM

## 2024-10-21 DIAGNOSIS — Z85.51: ICD-10-CM

## 2024-10-21 DIAGNOSIS — M51.369: ICD-10-CM

## 2024-10-21 DIAGNOSIS — M47.817: Primary | ICD-10-CM

## 2024-10-21 DIAGNOSIS — Z79.899: ICD-10-CM

## 2024-10-21 DIAGNOSIS — I10: ICD-10-CM

## 2024-10-21 DIAGNOSIS — M47.816: ICD-10-CM

## 2024-10-21 DIAGNOSIS — Z86.711: ICD-10-CM

## 2024-10-21 DIAGNOSIS — M46.96: ICD-10-CM

## 2024-10-21 DIAGNOSIS — E11.9: ICD-10-CM

## 2024-10-21 DIAGNOSIS — Z79.891: ICD-10-CM

## 2024-10-21 DIAGNOSIS — Z96.641: ICD-10-CM

## 2024-10-21 DIAGNOSIS — F17.210: ICD-10-CM

## 2024-10-21 DIAGNOSIS — Z79.84: ICD-10-CM

## 2024-10-21 PROCEDURE — 76000 FLUOROSCOPY <1 HR PHYS/QHP: CPT

## 2024-10-21 PROCEDURE — 64635 DESTROY LUMB/SAC FACET JNT: CPT

## 2024-10-21 PROCEDURE — A4216 STERILE WATER/SALINE, 10 ML: HCPCS

## 2024-10-21 PROCEDURE — 64636 DESTROY L/S FACET JNT ADDL: CPT

## 2024-10-21 PROCEDURE — 72100 X-RAY EXAM L-S SPINE 2/3 VWS: CPT

## 2024-10-21 RX ADMIN — LIDOCAINE HYDROCHLORIDE 30 ML: 10 INJECTION, SOLUTION EPIDURAL; INFILTRATION; INTRACAUDAL; PERINEURAL at 07:43

## 2025-04-21 ENCOUNTER — HOSPITAL ENCOUNTER (OUTPATIENT)
Dept: HOSPITAL 100 - SDC | Age: 55
Discharge: HOME | End: 2025-04-21
Payer: MEDICARE

## 2025-04-21 VITALS
TEMPERATURE: 98.9 F | SYSTOLIC BLOOD PRESSURE: 116 MMHG | HEART RATE: 89 BPM | RESPIRATION RATE: 14 BRPM | OXYGEN SATURATION: 95 % | DIASTOLIC BLOOD PRESSURE: 80 MMHG

## 2025-04-21 VITALS
HEART RATE: 97 BPM | RESPIRATION RATE: 18 BRPM | DIASTOLIC BLOOD PRESSURE: 93 MMHG | TEMPERATURE: 98.6 F | SYSTOLIC BLOOD PRESSURE: 144 MMHG | OXYGEN SATURATION: 95 %

## 2025-04-21 VITALS — BODY MASS INDEX: 54.4 KG/M2

## 2025-04-21 DIAGNOSIS — M51.17: Primary | ICD-10-CM

## 2025-04-21 DIAGNOSIS — M46.96: ICD-10-CM

## 2025-04-21 DIAGNOSIS — M51.16: ICD-10-CM

## 2025-04-21 DIAGNOSIS — L40.9: ICD-10-CM

## 2025-04-21 DIAGNOSIS — M48.07: ICD-10-CM

## 2025-04-21 DIAGNOSIS — Z86.711: ICD-10-CM

## 2025-04-21 DIAGNOSIS — Z96.651: ICD-10-CM

## 2025-04-21 DIAGNOSIS — M48.061: ICD-10-CM

## 2025-04-21 DIAGNOSIS — Z79.891: ICD-10-CM

## 2025-04-21 DIAGNOSIS — Z79.01: ICD-10-CM

## 2025-04-21 DIAGNOSIS — Z79.899: ICD-10-CM

## 2025-04-21 DIAGNOSIS — I10: ICD-10-CM

## 2025-04-21 DIAGNOSIS — E66.9: ICD-10-CM

## 2025-04-21 DIAGNOSIS — Z85.51: ICD-10-CM

## 2025-04-21 DIAGNOSIS — F17.210: ICD-10-CM

## 2025-04-21 PROCEDURE — 62323 NJX INTERLAMINAR LMBR/SAC: CPT

## 2025-04-21 PROCEDURE — 64483 NJX AA&/STRD TFRM EPI L/S 1: CPT

## 2025-04-21 PROCEDURE — 82962 GLUCOSE BLOOD TEST: CPT

## 2025-04-21 PROCEDURE — 3E0S3BZ INTRODUCTION OF ANESTHETIC AGENT INTO EPIDURAL SPACE, PERCUTANEOUS APPROACH: ICD-10-PCS | Performed by: ANESTHESIOLOGY

## 2025-04-21 PROCEDURE — 77003 FLUOROGUIDE FOR SPINE INJECT: CPT

## 2025-04-21 RX ADMIN — SODIUM CHLORIDE 10 ML: 9 INJECTION, SOLUTION INTRAMUSCULAR; INTRAVENOUS; SUBCUTANEOUS at 09:10

## 2025-04-21 RX ADMIN — LIDOCAINE HYDROCHLORIDE 5 ML: 10 SOLUTION INTRAVENOUS at 09:10

## 2025-05-05 ENCOUNTER — HOSPITAL ENCOUNTER (OUTPATIENT)
Dept: HOSPITAL 100 - PSN | Age: 55
Discharge: HOME | End: 2025-05-05
Payer: MEDICARE

## 2025-05-05 DIAGNOSIS — J45.20: Primary | ICD-10-CM

## 2025-05-05 PROCEDURE — 94726 PLETHYSMOGRAPHY LUNG VOLUMES: CPT

## 2025-05-05 PROCEDURE — 94729 DIFFUSING CAPACITY: CPT

## 2025-05-05 PROCEDURE — 94060 EVALUATION OF WHEEZING: CPT

## 2025-05-06 ENCOUNTER — HOSPITAL ENCOUNTER (EMERGENCY)
Age: 55
Discharge: HOME | End: 2025-05-06
Payer: MEDICARE

## 2025-05-06 VITALS — HEART RATE: 86 BPM | RESPIRATION RATE: 18 BRPM | OXYGEN SATURATION: 97 %

## 2025-05-06 VITALS
DIASTOLIC BLOOD PRESSURE: 69 MMHG | OXYGEN SATURATION: 100 % | SYSTOLIC BLOOD PRESSURE: 126 MMHG | RESPIRATION RATE: 18 BRPM | HEART RATE: 88 BPM

## 2025-05-06 VITALS — SYSTOLIC BLOOD PRESSURE: 105 MMHG | HEART RATE: 75 BPM | DIASTOLIC BLOOD PRESSURE: 74 MMHG | RESPIRATION RATE: 18 BRPM

## 2025-05-06 VITALS
RESPIRATION RATE: 16 BRPM | DIASTOLIC BLOOD PRESSURE: 69 MMHG | OXYGEN SATURATION: 100 % | SYSTOLIC BLOOD PRESSURE: 126 MMHG | HEART RATE: 78 BPM | TEMPERATURE: 98.24 F

## 2025-05-06 VITALS
DIASTOLIC BLOOD PRESSURE: 86 MMHG | RESPIRATION RATE: 20 BRPM | OXYGEN SATURATION: 96 % | TEMPERATURE: 96.8 F | HEART RATE: 93 BPM | SYSTOLIC BLOOD PRESSURE: 102 MMHG

## 2025-05-06 VITALS — OXYGEN SATURATION: 96 %

## 2025-05-06 VITALS
HEART RATE: 87 BPM | OXYGEN SATURATION: 93 % | RESPIRATION RATE: 17 BRPM | DIASTOLIC BLOOD PRESSURE: 64 MMHG | SYSTOLIC BLOOD PRESSURE: 115 MMHG

## 2025-05-06 DIAGNOSIS — Z79.84: ICD-10-CM

## 2025-05-06 DIAGNOSIS — R07.89: Primary | ICD-10-CM

## 2025-05-06 DIAGNOSIS — G47.33: ICD-10-CM

## 2025-05-06 DIAGNOSIS — F41.9: ICD-10-CM

## 2025-05-06 DIAGNOSIS — F17.200: ICD-10-CM

## 2025-05-06 DIAGNOSIS — Z86.711: ICD-10-CM

## 2025-05-06 DIAGNOSIS — J45.909: ICD-10-CM

## 2025-05-06 DIAGNOSIS — Z86.74: ICD-10-CM

## 2025-05-06 DIAGNOSIS — Z79.4: ICD-10-CM

## 2025-05-06 DIAGNOSIS — K21.9: ICD-10-CM

## 2025-05-06 DIAGNOSIS — Z79.85: ICD-10-CM

## 2025-05-06 DIAGNOSIS — Z85.51: ICD-10-CM

## 2025-05-06 DIAGNOSIS — K76.0: ICD-10-CM

## 2025-05-06 DIAGNOSIS — I10: ICD-10-CM

## 2025-05-06 DIAGNOSIS — Z79.899: ICD-10-CM

## 2025-05-06 DIAGNOSIS — Z86.718: ICD-10-CM

## 2025-05-06 DIAGNOSIS — E11.9: ICD-10-CM

## 2025-05-06 DIAGNOSIS — Z79.01: ICD-10-CM

## 2025-05-06 DIAGNOSIS — F32.A: ICD-10-CM

## 2025-05-06 DIAGNOSIS — E78.5: ICD-10-CM

## 2025-05-06 LAB
ANION GAP SERPL CALC-SCNC: 14 MMOL/L (ref 5–15)
BUN SERPL-MCNC: 10 MG/DL (ref 4–19)
BUN/CREAT SERPL: 10.2 RATIO (ref 10–20)
CHLORIDE: 100 MMOL/L (ref 98–108)
CO2 BLDCV-SCNC: 22.5 MMOL/L (ref 21–32)
DEPRECATED RDW RBC: 50.3 FL (ref 35.1–43.9)
ERYTHROCYTE [DISTWIDTH] IN BLOOD: 15.3 % (ref 11.6–14.6)
ESTIMATED CREATININE CLEARANCE: 148.44 ML/MIN (ref 50–250)
GLUCOSE SERPL-MCNC: 135 MG/DL (ref 70–99)
HCT VFR BLD AUTO: 45.1 % (ref 40–54)
HGB BLD-MCNC: 15.4 G/DL (ref 13–16.5)
MCV RBC: 90.6 FL (ref 80–94)
MEAN CORP HGB CONC: 34.1 G/DL (ref 32–36)
MEAN PLATELET VOL.: 9.4 FL (ref 6.2–12)
NRBC FLAGGED BY ANALYZER: 0 % (ref 0–5)
PLATELET # BLD: 219 K/MM3 (ref 150–450)
POTASSIUM SPEC-MCNC: 4.1 MMOL/L (ref 3.3–5.1)
RBC # BLD AUTO: 4.98 M/MM3 (ref 4.6–6.2)
TROPONIN T HIGH SENSITIVITY: < 6 NG/L (ref ?–22)
TROPONIN T SERPL HS-MCNC: < 6 NG/L (ref ?–22)
WBC # BLD AUTO: 9.5 K/MM3 (ref 4.4–11)

## 2025-05-06 PROCEDURE — A4216 STERILE WATER/SALINE, 10 ML: HCPCS

## 2025-05-06 PROCEDURE — 80048 BASIC METABOLIC PNL TOTAL CA: CPT

## 2025-05-06 PROCEDURE — 71275 CT ANGIOGRAPHY CHEST: CPT

## 2025-05-06 PROCEDURE — 85025 COMPLETE CBC W/AUTO DIFF WBC: CPT

## 2025-05-06 PROCEDURE — 84484 ASSAY OF TROPONIN QUANT: CPT

## 2025-05-06 PROCEDURE — 99284 EMERGENCY DEPT VISIT MOD MDM: CPT

## 2025-05-06 PROCEDURE — 93005 ELECTROCARDIOGRAM TRACING: CPT

## 2025-06-17 ENCOUNTER — HOSPITAL ENCOUNTER (OUTPATIENT)
Dept: HOSPITAL 100 - LAB | Age: 55
Discharge: HOME | End: 2025-06-17
Payer: MEDICARE

## 2025-06-17 DIAGNOSIS — I10: Primary | ICD-10-CM

## 2025-06-17 LAB
CHOLEST SERPL-MCNC: 201 MG/DL (ref ?–200)
CHOLESTEROL:HDL RATIO SCREEN: 6.2
HDLC SERPL-MCNC: 32 MG/DL
LOW DENSITY LIPOPROTEIN CALC.: 137 MG/DL
TRIGLYCERIDES: 159 MG/DL
VLDLC SERPL-MCNC: 32 MG/DL (ref 5–40)

## 2025-06-17 PROCEDURE — 80061 LIPID PANEL: CPT

## 2025-06-17 PROCEDURE — 36415 COLL VENOUS BLD VENIPUNCTURE: CPT

## 2025-07-01 ENCOUNTER — HOSPITAL ENCOUNTER (OUTPATIENT)
Age: 55
Discharge: HOME | End: 2025-07-01
Payer: MEDICARE

## 2025-07-01 DIAGNOSIS — I20.9: Primary | ICD-10-CM

## 2025-07-01 DIAGNOSIS — I10: ICD-10-CM

## 2025-07-01 DIAGNOSIS — Z79.01: ICD-10-CM

## 2025-07-01 DIAGNOSIS — Z51.81: ICD-10-CM

## 2025-07-01 LAB
ABSOLUTE NEUTROPHIL COUNT: 4.6 X10^3/UL (ref 2–7.7)
ANION GAP SERPL CALC-SCNC: 15 MMOL/L (ref 5–15)
ANISOCYTOSIS BLD QL SMEAR: (no result)
APTT PPP: 38.3 SECONDS (ref 24.1–36.2)
BASO STIPL BLD QL SMEAR: (no result)
BASOPHIL#: 0.06 X10^3/UL
BASOPHIL%: 0.7 % (ref 0–1)
BASOPHILS NFR SPEC MANUAL: (no result) % (ref 0–1)
BITE CELLS BLD QL SMEAR: (no result)
BUN SERPL-MCNC: 10 MG/DL (ref 4–19)
BUN/CREAT SERPL: 12.3 RATIO (ref 10–20)
BURR CELLS BLD QL SMEAR: (no result)
CALCIUM SERPL-MCNC: 8.7 MG/DL (ref 7.6–11)
CHLORIDE: 99 MMOL/L (ref 98–108)
CO2 BLDCV-SCNC: 21.9 MMOL/L (ref 21–32)
CREAT SERPL-MCNC: 0.83 MG/DL (ref 0.7–1.2)
DEPRECATED RDW RBC: 48.3 FL (ref 35.1–43.9)
DOHLE BOD BLD QL SMEAR: (no result)
EOSINOPHIL # BLD: 0.17 X10^3/UL
ERYTHROCYTE [DISTWIDTH] IN BLOOD: 14.6 % (ref 11.6–14.6)
GLUCOSE SERPL-MCNC: 111 MG/DL (ref 70–99)
HCT VFR BLD AUTO: 40.2 % (ref 40–54)
HGB BLD-MCNC: 13.6 G/DL (ref 13–16.5)
HOWELL-JOLLY BOD BLD QL SMEAR: (no result)
HYPOCHROMIA BLD QL: (no result)
IMM GRANULOCYTES NFR BLD AUTO: 0.5 % (ref 0–0.9)
IMMATURE GRANULOCYTES COUNT: 0.04 X10^3/UL (ref 0–0)
INTERNATIONAL NORMALIZED RATIO: 1.9
LYMPHOCYTES # SPEC AUTO: 2.83 X10^3/UL (ref 0.83–4.51)
LYMPHOCYTES # SPEC AUTO: 2.83 X10^3/UL (ref 0.83–4.51)
LYMPHOCYTES NFR SPEC AUTO: 34 % (ref 19–41)
Lab: 2 % (ref 0–5)
MACROCYTES BLD QL: (no result)
MANUAL DIF COMMENT BLD-IMP: (no result)
MCH RBC QN AUTO: 30.6 PG (ref 27–32)
MCV RBC: 90.3 FL (ref 80–94)
MEAN CORP HGB CONC: 33.8 G/DL (ref 32–36)
MEAN PLATELET VOL.: 10 FL (ref 6.2–12)
MONOCYTE#: 0.6 X10^3/UL
MONOCYTE%: 7.2 % (ref 0–10)
NEUTROPHIL #: 4.63 X10^3/UL (ref 2.7–7.7)
NEUTROPHILS NFR BLD AUTO: 55.6 % (ref 47–70)
NEUTS HYPERSEG # BLD: (no result) 10*3/UL
NRBC FLAGGED BY ANALYZER: 0 % (ref 0–5)
PLATELET # BLD: 204 K/MM3 (ref 150–450)
POLYCHROMASIA BLD QL SMEAR: (no result)
POTASSIUM SPEC-MCNC: 4 MMOL/L (ref 3.3–5.1)
PROTHROMBIN TIME (PROTIME)PT.: 21.9 SECONDS (ref 11.7–14.9)
RBC # BLD AUTO: 4.45 M/MM3 (ref 4.6–6.2)
SODIUM LEVEL: 136 MMOL/L (ref 133–145)
WBC # BLD AUTO: 8.3 K/MM3 (ref 4.4–11)
WBC NRBC COR # BLD: (no result) K/MM3 (ref 4.4–11)

## 2025-07-01 PROCEDURE — 36415 COLL VENOUS BLD VENIPUNCTURE: CPT

## 2025-07-01 PROCEDURE — 85730 THROMBOPLASTIN TIME PARTIAL: CPT

## 2025-07-01 PROCEDURE — 85610 PROTHROMBIN TIME: CPT

## 2025-07-01 PROCEDURE — 85025 COMPLETE CBC W/AUTO DIFF WBC: CPT

## 2025-07-01 PROCEDURE — 80048 BASIC METABOLIC PNL TOTAL CA: CPT

## 2025-07-11 ENCOUNTER — HOSPITAL ENCOUNTER (OUTPATIENT)
Dept: HOSPITAL 100 - CT | Age: 55
Discharge: HOME | End: 2025-07-11
Payer: MEDICARE

## 2025-07-11 DIAGNOSIS — F17.210: Primary | ICD-10-CM

## 2025-07-11 PROCEDURE — 71271 CT THORAX LUNG CANCER SCR C-: CPT

## 2025-07-15 ENCOUNTER — HOSPITAL ENCOUNTER (OUTPATIENT)
Age: 55
Discharge: HOME | End: 2025-07-15
Payer: MEDICARE

## 2025-07-15 DIAGNOSIS — Z85.51: ICD-10-CM

## 2025-07-15 DIAGNOSIS — F41.9: ICD-10-CM

## 2025-07-15 DIAGNOSIS — Z86.718: ICD-10-CM

## 2025-07-15 DIAGNOSIS — I27.20: ICD-10-CM

## 2025-07-15 DIAGNOSIS — I25.119: Primary | ICD-10-CM

## 2025-07-15 DIAGNOSIS — F32.A: ICD-10-CM

## 2025-07-15 DIAGNOSIS — F17.210: ICD-10-CM

## 2025-07-15 DIAGNOSIS — Z79.84: ICD-10-CM

## 2025-07-15 DIAGNOSIS — J45.909: ICD-10-CM

## 2025-07-15 DIAGNOSIS — Z79.01: ICD-10-CM

## 2025-07-15 DIAGNOSIS — I10: ICD-10-CM

## 2025-07-15 DIAGNOSIS — E11.9: ICD-10-CM

## 2025-07-15 DIAGNOSIS — K76.0: ICD-10-CM

## 2025-07-15 DIAGNOSIS — Z86.711: ICD-10-CM

## 2025-07-15 DIAGNOSIS — G47.33: ICD-10-CM

## 2025-07-15 DIAGNOSIS — K21.9: ICD-10-CM

## 2025-07-15 DIAGNOSIS — Z79.51: ICD-10-CM

## 2025-07-15 DIAGNOSIS — Z79.85: ICD-10-CM

## 2025-07-15 DIAGNOSIS — Z86.74: ICD-10-CM

## 2025-07-15 DIAGNOSIS — E66.01: ICD-10-CM

## 2025-07-15 DIAGNOSIS — Z79.899: ICD-10-CM

## 2025-07-15 DIAGNOSIS — Z79.4: ICD-10-CM

## 2025-07-15 PROCEDURE — C1769 GUIDE WIRE: HCPCS

## 2025-07-15 PROCEDURE — 99152 MOD SED SAME PHYS/QHP 5/>YRS: CPT

## 2025-07-15 PROCEDURE — 93458 L HRT ARTERY/VENTRICLE ANGIO: CPT

## 2025-07-15 PROCEDURE — C1894 INTRO/SHEATH, NON-LASER: HCPCS

## 2025-07-15 PROCEDURE — 99153 MOD SED SAME PHYS/QHP EA: CPT

## 2025-07-25 ENCOUNTER — HOSPITAL ENCOUNTER (EMERGENCY)
Age: 55
Discharge: HOME | End: 2025-07-25
Payer: MEDICARE

## 2025-07-25 VITALS
DIASTOLIC BLOOD PRESSURE: 87 MMHG | OXYGEN SATURATION: 97 % | RESPIRATION RATE: 18 BRPM | TEMPERATURE: 98.4 F | SYSTOLIC BLOOD PRESSURE: 168 MMHG | HEART RATE: 94 BPM

## 2025-07-25 VITALS
HEART RATE: 79 BPM | DIASTOLIC BLOOD PRESSURE: 77 MMHG | OXYGEN SATURATION: 95 % | TEMPERATURE: 97.88 F | SYSTOLIC BLOOD PRESSURE: 125 MMHG | RESPIRATION RATE: 18 BRPM

## 2025-07-25 VITALS
RESPIRATION RATE: 18 BRPM | DIASTOLIC BLOOD PRESSURE: 87 MMHG | SYSTOLIC BLOOD PRESSURE: 168 MMHG | TEMPERATURE: 98.42 F | HEART RATE: 94 BPM | OXYGEN SATURATION: 97 %

## 2025-07-25 DIAGNOSIS — I10: ICD-10-CM

## 2025-07-25 DIAGNOSIS — F17.210: ICD-10-CM

## 2025-07-25 DIAGNOSIS — M72.2: Primary | ICD-10-CM

## 2025-07-25 DIAGNOSIS — Z79.01: ICD-10-CM

## 2025-07-25 DIAGNOSIS — M79.671: ICD-10-CM

## 2025-07-25 DIAGNOSIS — E11.40: ICD-10-CM

## 2025-07-25 DIAGNOSIS — Z86.718: ICD-10-CM

## 2025-07-25 PROCEDURE — 96372 THER/PROPH/DIAG INJ SC/IM: CPT

## 2025-07-25 PROCEDURE — 99283 EMERGENCY DEPT VISIT LOW MDM: CPT

## 2025-07-25 PROCEDURE — 73610 X-RAY EXAM OF ANKLE: CPT

## 2025-07-25 PROCEDURE — 73630 X-RAY EXAM OF FOOT: CPT
